# Patient Record
Sex: MALE | Race: ASIAN | NOT HISPANIC OR LATINO | URBAN - METROPOLITAN AREA
[De-identification: names, ages, dates, MRNs, and addresses within clinical notes are randomized per-mention and may not be internally consistent; named-entity substitution may affect disease eponyms.]

---

## 2024-08-01 ENCOUNTER — INPATIENT (INPATIENT)
Facility: HOSPITAL | Age: 74
LOS: 4 days | Discharge: HOME | DRG: 189 | End: 2024-08-06
Attending: STUDENT IN AN ORGANIZED HEALTH CARE EDUCATION/TRAINING PROGRAM | Admitting: STUDENT IN AN ORGANIZED HEALTH CARE EDUCATION/TRAINING PROGRAM
Payer: COMMERCIAL

## 2024-08-01 VITALS
DIASTOLIC BLOOD PRESSURE: 68 MMHG | HEART RATE: 75 BPM | OXYGEN SATURATION: 92 % | WEIGHT: 123.02 LBS | SYSTOLIC BLOOD PRESSURE: 123 MMHG | HEIGHT: 65 IN | TEMPERATURE: 98 F | RESPIRATION RATE: 24 BRPM

## 2024-08-01 DIAGNOSIS — Z95.1 PRESENCE OF AORTOCORONARY BYPASS GRAFT: Chronic | ICD-10-CM

## 2024-08-01 DIAGNOSIS — J96.01 ACUTE RESPIRATORY FAILURE WITH HYPOXIA: ICD-10-CM

## 2024-08-01 LAB
ALBUMIN SERPL ELPH-MCNC: 2.5 G/DL — LOW (ref 3.5–5)
ALP SERPL-CCNC: 100 U/L — SIGNIFICANT CHANGE UP (ref 40–120)
ALT FLD-CCNC: 7 U/L DA — LOW (ref 10–60)
ANION GAP SERPL CALC-SCNC: 8 MMOL/L — SIGNIFICANT CHANGE UP (ref 5–17)
AST SERPL-CCNC: 18 U/L — SIGNIFICANT CHANGE UP (ref 10–40)
BASOPHILS # BLD AUTO: 0.02 K/UL — SIGNIFICANT CHANGE UP (ref 0–0.2)
BASOPHILS NFR BLD AUTO: 0.4 % — SIGNIFICANT CHANGE UP (ref 0–2)
BILIRUB SERPL-MCNC: 0.8 MG/DL — SIGNIFICANT CHANGE UP (ref 0.2–1.2)
BUN SERPL-MCNC: 24 MG/DL — HIGH (ref 7–18)
CALCIUM SERPL-MCNC: 8.8 MG/DL — SIGNIFICANT CHANGE UP (ref 8.4–10.5)
CHLORIDE SERPL-SCNC: 101 MMOL/L — SIGNIFICANT CHANGE UP (ref 96–108)
CO2 SERPL-SCNC: 26 MMOL/L — SIGNIFICANT CHANGE UP (ref 22–31)
CREAT SERPL-MCNC: 5.34 MG/DL — HIGH (ref 0.5–1.3)
EGFR: 11 ML/MIN/1.73M2 — LOW
EOSINOPHIL # BLD AUTO: 0.12 K/UL — SIGNIFICANT CHANGE UP (ref 0–0.5)
EOSINOPHIL NFR BLD AUTO: 2.4 % — SIGNIFICANT CHANGE UP (ref 0–6)
GLUCOSE SERPL-MCNC: 117 MG/DL — HIGH (ref 70–99)
HCT VFR BLD CALC: 27 % — LOW (ref 39–50)
HGB BLD-MCNC: 8.4 G/DL — LOW (ref 13–17)
IMM GRANULOCYTES NFR BLD AUTO: 0.2 % — SIGNIFICANT CHANGE UP (ref 0–0.9)
LYMPHOCYTES # BLD AUTO: 0.45 K/UL — LOW (ref 1–3.3)
LYMPHOCYTES # BLD AUTO: 9.1 % — LOW (ref 13–44)
MAGNESIUM SERPL-MCNC: 1.9 MG/DL — SIGNIFICANT CHANGE UP (ref 1.6–2.6)
MCHC RBC-ENTMCNC: 27.9 PG — SIGNIFICANT CHANGE UP (ref 27–34)
MCHC RBC-ENTMCNC: 31.1 GM/DL — LOW (ref 32–36)
MCV RBC AUTO: 89.7 FL — SIGNIFICANT CHANGE UP (ref 80–100)
MONOCYTES # BLD AUTO: 0.15 K/UL — SIGNIFICANT CHANGE UP (ref 0–0.9)
MONOCYTES NFR BLD AUTO: 3 % — SIGNIFICANT CHANGE UP (ref 2–14)
NEUTROPHILS # BLD AUTO: 4.19 K/UL — SIGNIFICANT CHANGE UP (ref 1.8–7.4)
NEUTROPHILS NFR BLD AUTO: 84.9 % — HIGH (ref 43–77)
NRBC # BLD: 0 /100 WBCS — SIGNIFICANT CHANGE UP (ref 0–0)
NT-PROBNP SERPL-SCNC: HIGH PG/ML (ref 0–125)
PHOSPHATE SERPL-MCNC: 3.5 MG/DL — SIGNIFICANT CHANGE UP (ref 2.5–4.5)
PLATELET # BLD AUTO: 108 K/UL — LOW (ref 150–400)
POTASSIUM SERPL-MCNC: 4.4 MMOL/L — SIGNIFICANT CHANGE UP (ref 3.5–5.3)
POTASSIUM SERPL-SCNC: 4.4 MMOL/L — SIGNIFICANT CHANGE UP (ref 3.5–5.3)
PROT SERPL-MCNC: 7.2 G/DL — SIGNIFICANT CHANGE UP (ref 6–8.3)
RAPID RVP RESULT: SIGNIFICANT CHANGE UP
RBC # BLD: 3.01 M/UL — LOW (ref 4.2–5.8)
RBC # FLD: 17.2 % — HIGH (ref 10.3–14.5)
SARS-COV-2 RNA SPEC QL NAA+PROBE: SIGNIFICANT CHANGE UP
SODIUM SERPL-SCNC: 135 MMOL/L — SIGNIFICANT CHANGE UP (ref 135–145)
WBC # BLD: 4.94 K/UL — SIGNIFICANT CHANGE UP (ref 3.8–10.5)
WBC # FLD AUTO: 4.94 K/UL — SIGNIFICANT CHANGE UP (ref 3.8–10.5)

## 2024-08-01 PROCEDURE — 72100 X-RAY EXAM L-S SPINE 2/3 VWS: CPT | Mod: 26

## 2024-08-01 PROCEDURE — 71045 X-RAY EXAM CHEST 1 VIEW: CPT | Mod: 26

## 2024-08-01 PROCEDURE — 99285 EMERGENCY DEPT VISIT HI MDM: CPT

## 2024-08-01 PROCEDURE — 93010 ELECTROCARDIOGRAM REPORT: CPT

## 2024-08-01 PROCEDURE — 99223 1ST HOSP IP/OBS HIGH 75: CPT | Mod: GC

## 2024-08-01 RX ORDER — ACETAMINOPHEN 500 MG
650 TABLET ORAL EVERY 6 HOURS
Refills: 0 | Status: DISCONTINUED | OUTPATIENT
Start: 2024-08-01 | End: 2024-08-06

## 2024-08-01 RX ORDER — MELATONIN 3 MG
5 TABLET ORAL AT BEDTIME
Refills: 0 | Status: DISCONTINUED | OUTPATIENT
Start: 2024-08-01 | End: 2024-08-06

## 2024-08-01 RX ORDER — LIDOCAINE 5% 5 G/100G
1 CREAM TOPICAL ONCE
Refills: 0 | Status: COMPLETED | OUTPATIENT
Start: 2024-08-01 | End: 2024-08-01

## 2024-08-01 RX ORDER — METHOCARBAMOL 500 MG
1000 TABLET ORAL ONCE
Refills: 0 | Status: COMPLETED | OUTPATIENT
Start: 2024-08-01 | End: 2024-08-01

## 2024-08-01 RX ORDER — ONDANSETRON HCL/PF 4 MG/2 ML
4 VIAL (ML) INJECTION EVERY 8 HOURS
Refills: 0 | Status: DISCONTINUED | OUTPATIENT
Start: 2024-08-01 | End: 2024-08-06

## 2024-08-01 RX ORDER — HEPARIN SODIUM 1000 [USP'U]/ML
5000 INJECTION, SOLUTION INTRAVENOUS; SUBCUTANEOUS EVERY 8 HOURS
Refills: 0 | Status: DISCONTINUED | OUTPATIENT
Start: 2024-08-01 | End: 2024-08-06

## 2024-08-01 RX ORDER — HYDROMORPHONE HCL IN 0.9% NACL 0.2 MG/ML
0.25 PLASTIC BAG, INJECTION (ML) INTRAVENOUS ONCE
Refills: 0 | Status: DISCONTINUED | OUTPATIENT
Start: 2024-08-01 | End: 2024-08-01

## 2024-08-01 RX ORDER — METHYLPREDNISOLONE ACETATE 40 MG/ML
24 INJECTION, SUSPENSION INTRA-ARTICULAR; INTRALESIONAL; INTRAMUSCULAR; INTRASYNOVIAL; SOFT TISSUE ONCE
Refills: 0 | Status: COMPLETED | OUTPATIENT
Start: 2024-08-01 | End: 2024-08-01

## 2024-08-01 RX ORDER — OXYCODONE HYDROCHLORIDE 30 MG/1
5 TABLET ORAL ONCE
Refills: 0 | Status: DISCONTINUED | OUTPATIENT
Start: 2024-08-01 | End: 2024-08-01

## 2024-08-01 RX ORDER — HYDROMORPHONE HCL IN 0.9% NACL 0.2 MG/ML
1 PLASTIC BAG, INJECTION (ML) INTRAVENOUS EVERY 6 HOURS
Refills: 0 | Status: DISCONTINUED | OUTPATIENT
Start: 2024-08-01 | End: 2024-08-02

## 2024-08-01 RX ORDER — OXYCODONE HYDROCHLORIDE 30 MG/1
15 TABLET ORAL EVERY 6 HOURS
Refills: 0 | Status: DISCONTINUED | OUTPATIENT
Start: 2024-08-01 | End: 2024-08-02

## 2024-08-01 RX ORDER — MAGNESIUM, ALUMINUM HYDROXIDE 200-225/5
30 SUSPENSION, ORAL (FINAL DOSE FORM) ORAL EVERY 4 HOURS
Refills: 0 | Status: DISCONTINUED | OUTPATIENT
Start: 2024-08-01 | End: 2024-08-06

## 2024-08-01 RX ADMIN — OXYCODONE HYDROCHLORIDE 5 MILLIGRAM(S): 30 TABLET ORAL at 18:29

## 2024-08-01 RX ADMIN — LIDOCAINE 5% 1 PATCH: 5 CREAM TOPICAL at 14:40

## 2024-08-01 RX ADMIN — Medication 1000 MILLIGRAM(S): at 18:29

## 2024-08-01 RX ADMIN — Medication 0.25 MILLIGRAM(S): at 23:31

## 2024-08-01 RX ADMIN — OXYCODONE HYDROCHLORIDE 5 MILLIGRAM(S): 30 TABLET ORAL at 19:41

## 2024-08-01 RX ADMIN — Medication 0.25 MILLIGRAM(S): at 21:38

## 2024-08-01 RX ADMIN — METHYLPREDNISOLONE ACETATE 24 MILLIGRAM(S): 40 INJECTION, SUSPENSION INTRA-ARTICULAR; INTRALESIONAL; INTRAMUSCULAR; INTRASYNOVIAL; SOFT TISSUE at 18:29

## 2024-08-01 NOTE — CHART NOTE - NSCHARTNOTEFT_GEN_A_CORE
Called by ER for Hemodialysis for this patient admitted for dyspnea and pain.  Chart notes, CXR and labs reviewed.  Recommendations:  - Lasix 80 mg IVP.  - HD scheduled for early tomorrow.  - Supplement O2 as needed.    Daniel Quintanilla MD  Nephrology

## 2024-08-01 NOTE — H&P ADULT - PROBLEM SELECTOR PLAN 4
heparin Patient complaining of severe radiating back pain  oxy and dilaudid, lidocaine patch  consult pain mgmt

## 2024-08-01 NOTE — ED PROVIDER NOTE - CARE PLAN
1 Principal Discharge DX:	Acute respiratory failure with hypoxemia  Secondary Diagnosis:	Acute pulmonary edema

## 2024-08-01 NOTE — ED PROVIDER NOTE - CLINICAL SUMMARY MEDICAL DECISION MAKING FREE TEXT BOX
74 y/o M PMH of AS s/p TAVR, ESRD on HD (MWF), HTN presenting with radicular low back pain   Likely radiculopathy/spinal stenosis  Obtain plain films  Analgesia, Medrol Dose Pack  Reassess  Outpatient spine follow up. 72 y/o M PMH of AS s/p TAVR, ESRD on HD (MWF), HTN presenting with radicular low back pain   Likely radiculopathy/spinal stenosis  Obtain plain films  Analgesia, Medrol Dose Pack  Reassess    Patient hypoxemic with law pulmonary edema on CXR---admit for UF. d/w nephrology 72 y/o M PMH of AS s/p TAVR, ESRD on HD (MWF), HTN presenting with radicular low back pain   Likely radiculopathy/spinal stenosis  Obtain plain films  Analgesia, Medrol Dose Pack  Reassess    Patient hypoxemic with law pulmonary edema on CXR---admit for UF. d/w nephrology  Does not make any urine.

## 2024-08-01 NOTE — ED PROVIDER NOTE - OBJECTIVE STATEMENT
72 y/o M PMH of AS s/p TAVR, ESRD on HD (MWF), HTN presenting with low back pain     Has noted lower back pain for the last week with radicular quality down both legs. No f/c. Last HD yesterday. No chest pain, bowel/bladder incontinence or leg weakness. Has been taking Tylenol at home with little relief. No recent falls. 74 y/o M PMH of AS s/p TAVR, ESRD on HD (MWF), HTN presenting with low back pain     Has noted lower back pain for the last week with radicular quality down both legs. No f/c. Last HD yesterday. No chest pain, bowel/bladder incontinence or leg weakness. Has been taking Tylenol at home with little relief. No recent falls. Patient also reporting SOB. 72 y/o M PMH of AS s/p TAVR, ESRD on HD (MWF), HTN presenting with low back pain     Has noted lower back pain for the last week with radicular quality down both legs. No f/c. Last HD yesterday. No chest pain, bowel/bladder incontinence or leg weakness. Has been taking Tylenol at home with little relief. No recent falls. Patient also reporting SOB, no chest pain. ([facility]). PCP: Dr. Lynch

## 2024-08-01 NOTE — H&P ADULT - NSHPPHYSICALEXAM_GEN_ALL_CORE
PHYSICAL EXAMINATION:  GENERAL: NAD  HEAD:  Atraumatic, Normocephalic  EYES:  conjunctiva and sclera clear  NECK: Supple, mild JVD, Normal thyroid  CHEST/LUNG: bilateral diffuse coarse crackles  HEART: Regular rate and rhythm; No murmurs, rubs, or gallops  ABDOMEN: Soft, Nontender, Nondistended; Bowel sounds present  NERVOUS SYSTEM:  Alert & Oriented X3,    EXTREMITIES:  2+ Peripheral Pulses, No clubbing, cyanosis, or edema. Left forearm fistula  SKIN: warm dry    T(C): 36.5 (08-02-24 @ 00:07), Max: 36.5 (08-02-24 @ 00:07)  HR: 72 (08-02-24 @ 00:07) (72 - 88)  BP: 126/64 (08-02-24 @ 00:07) (123/68 - 127/69)  RR: 20 (08-02-24 @ 00:07) (20 - 24)  SpO2: 98% (08-02-24 @ 00:07) (92% - 100%)

## 2024-08-01 NOTE — H&P ADULT - PROBLEM SELECTOR PLAN 1
2/2 pulm edema due to fluid overload  CXR showing large bilateral consolidations  patient on 3L NC  Patient anuric so no benefit of diuresis  patient to be dialysed  nephro Dr. Quintanilla consulted

## 2024-08-01 NOTE — H&P ADULT - ATTENDING COMMENTS
IMAGING  Chest X-Ray  Pending official read; on my read there is bilateral lower lobe consolidations with moderate pleural effusions.    Lumbar spine  Pending official read; on my read no fracture    EKG  Normal Sinus Rhythm, LVH, 74 bpm, QTc 486ms, MA 198ms, QRS 120ms, on my read no ST segment elevation or depression, TWI in V1, I, aVL    HPI  73 year old male patient with pmhx AS s/p TAVR, CAD s/p CABG 2018, PCI RCA in 6/2019, atrial fibrillation, CHF, ESRD (anuric) on HD (MWF), HTN, HLD, mild PAD who presented to the ER due to low back pain for the past week with radiculopathy down both legs.  The pain is worse both with straightening his back and worse with bending forward. The patient has also been increasingly short of breath for the past 2 days. In the ER patient's SpO2 is 88% on room air. BNP of 21,805 in the ER. Patient denies eating increasingly salty food, but he states he has been drinking more fluids.    Review Of Systems included: + low back pain with radiculopathy, + shortness of breath, + dyspnea on exertion, + chronic constipation,   No orthopnea, no cough, no green or yellow  phlegm production, no fever, no chills, no chest pain, no diarrhea    Physical Exam  General: Awake, Alert, Oriented  Cardiac: RRR, No murmur  Pulmonary: Crackles b/l  Abdominal: Soft, ND, NT  Back: Patient points to middle of low back as source of pain, no muscle tightness, no tenderness to palpation of gluteal muscles, straight leg test with low back pain with mild radiculopathy bilaterally  Extremities: No edema b/l    A/P  # Acute Hypoxic Respiratory Failure  # CHF Exacerbation  # Fluid Overloaded  # Hx of ESRD  > BNP of 21,805 with pleural effusions on CXR, but no edema on lower extremities bilaterally  > 88% O2 saturation on room air  > Anuric; lasix not indicated  > Afebrile, no leukocytosis; pneumonia unlikely  > Patient without chest pain, no heart murmur on exam, emergent transfer not indicated at this time; but, will order a morning ECHO given his recent TAVR about a month ago.  - Consult Cardiology  - Consult Nephrology for HD  - ECHO  - Supplemental O2, wean down as tolerated    # Lower Back Pain with Bilaterally Radiculopathy  Likely Arthritis vs Spinal Stenosis vs Lumbar Disc Herniation  - Consult Pain management  - Tylenol prn for mild pain, Oxycodone prn for moderate pain, Dilaudid prn for severe pain  - Lidocaine Patch    # Hx of AS s/p TAVR, CAD, atrial fibrillation, CHF, ESRD, HTN, HLD, PAD  - Continue home medications metoprolol, aspirin    # DVT PPx  - Heparin    # FEN  - Vegetarian DASH Renal Diet  - Monitor and replete electrolytes as needed  - Avoid standing fluids given ESRD    Previous Admissions Included  6/13/2024 - 6/29/2024: acute hypoxic respiratory failure 2/2 volume overload from ADHF/severe aortic stenosis now s/p Transcatheter aortic valve replacement (TAVR) with Dr. Lindo on 6/24/2024 at [facility].   8/24/2020 - 8/26/2020: Dyspnea 2/2 pleural effusions which may be attributed to hypervolemia in setting of CHF and/or ESRD  5/7/2018 - 5/17/2018: s/p CABG. Off-pump coronary artery bypass grafting x4 (in situ left internal mammary artery to left anterior descending artery, greater saphenous vein graft from the ascending aorta sequentially side-to-side to the first obtuse marginal artery, sequentially side-to-side to the posterior lateral artery, and end-to-side sequentially to posterior descending artery). Post op he had PAF treated with amiodarone IV and beta-blocker and converted to SR. An echo revealed a moderate pericardial effusion and he underwent Pericardial drain placement by IR. POD#5 pericardial drain removed. POD#6 left pleural effusion, s/p pigtail inserted.    Patient case and management was discussed with ER Attending. Patient without chest pain, no heart murmur on exam, emergent transfer not indicated at this time; but, will order a morning ECHO given his recent TAVR about a month ago.  I did examine all labs (including CBC, BMP, Mg, RVP), imaging, prior notes

## 2024-08-01 NOTE — ED ADULT NURSE NOTE - SUICIDE SCREENING QUESTION 2
Patient unable to complete 5-Fu Counseling: 5-Fluorouracil Counseling:  I discussed with the patient the risks of 5-fluorouracil including but not limited to erythema, scaling, itching, weeping, crusting, and pain.

## 2024-08-01 NOTE — ED PROVIDER NOTE - PROGRESS NOTE DETAILS
desaturates to 88% on RA and CXR with law pulmonary edema. d/w Dr. Quintanilla plan for  d/w Dr. Esipnoza admitted to medical service.

## 2024-08-01 NOTE — ED ADULT NURSE NOTE - NSFALLRISKINTERV_ED_ALL_ED
Assistance OOB with selected safe patient handling equipment if applicable/Assistance with ambulation/Communicate fall risk and risk factors to all staff, patient, and family/Provide patient with walking aids/Provide visual cue: yellow wristband, yellow gown, etc/Reinforce activity limits and safety measures with patient and family/Call bell, personal items and telephone in reach/Instruct patient to call for assistance before getting out of bed/chair/stretcher/Non-slip footwear applied when patient is off stretcher/[place] to call system/Physically safe environment - no spills, clutter or unnecessary equipment/Purposeful Proactive Rounding/Room/bathroom lighting operational, light cord in reach

## 2024-08-01 NOTE — ED PROVIDER NOTE - PHYSICAL EXAMINATION
GENERAL: no acute distress; well-developed  HEAD:  Atraumatic, Normocephalic  ENT: MMM; oropharynx clear  NECK: Supple, No JVD  CHEST/LUNG: Clear to auscultation bilaterally; No wheeze  HEART: Regular rate and rhythm; No murmurs, rubs, or gallops  ABDOMEN: Soft, Nontender, Nondistended; Bowel sounds present  EXTREMITIES:  2+ Peripheral Pulses, +AVF  PSYCH: AAOx3  NEUROLOGY: no focal motor or sensory deficits. 5/5 muscle strength in all extremities.   SKIN: No rashes or lesions    BACK: paraspinal lumbar hypertonicity and ttp R >L. No point spinal ttp. GENERAL: no acute distress; well-developed  HEAD:  Atraumatic, Normocephalic  ENT: MMM; oropharynx clear  NECK: Supple, No JVD  CHEST/LUNG: Bibasilar rales.   HEART: Regular rate and rhythm; No murmurs, rubs, or gallops  ABDOMEN: Soft, Nontender, Nondistended; Bowel sounds present  EXTREMITIES:  2+ Peripheral Pulses, +AVF  PSYCH: AAOx3  NEUROLOGY: no focal motor or sensory deficits. 5/5 muscle strength in all extremities.   SKIN: No rashes or lesions    BACK: paraspinal lumbar hypertonicity and ttp R >L. No point spinal ttp.

## 2024-08-01 NOTE — H&P ADULT - HISTORY OF PRESENT ILLNESS
73M PMHx of aortic stenosis s/p TAVR, and ESRD on dialysis MWF presented to the ED with dyspnea and back pain. Pateint stated the dyspnea started 2 days ago and he has not missed any dialysis sessions. Last session was yesterday and was complete. Patient denies any fever, chills, dizziness ,headache,cough, chest pain, palpitations, nausea, vomiting, diarrhea, abdominal pain, urinary symptoms, lower extremity pain, or edema. Patient denies recent travel or sick contacts. Patient also complaining of LBP that started one month previously. Pattient states pain is radiating bilaterally in both legs. Patient denies any  loss of muscle strength, loss of sensation/numbness, and denies bowel or bladder incontinence.

## 2024-08-01 NOTE — H&P ADULT - ASSESSMENT
73M PMHx of aortic stenosis s/p TAVR, and ESRD on dialysis MWF presented to the ED with dyspnea and back pain. Patient admitted for AHRF 2/2 pulmonary edema due to fluid overload.

## 2024-08-02 DIAGNOSIS — J96.01 ACUTE RESPIRATORY FAILURE WITH HYPOXIA: ICD-10-CM

## 2024-08-02 DIAGNOSIS — Z29.9 ENCOUNTER FOR PROPHYLACTIC MEASURES, UNSPECIFIED: ICD-10-CM

## 2024-08-02 DIAGNOSIS — M54.9 DORSALGIA, UNSPECIFIED: ICD-10-CM

## 2024-08-02 DIAGNOSIS — N18.6 END STAGE RENAL DISEASE: ICD-10-CM

## 2024-08-02 DIAGNOSIS — Z95.2 PRESENCE OF PROSTHETIC HEART VALVE: ICD-10-CM

## 2024-08-02 DIAGNOSIS — Z75.8 OTHER PROBLEMS RELATED TO MEDICAL FACILITIES AND OTHER HEALTH CARE: ICD-10-CM

## 2024-08-02 LAB
ALBUMIN SERPL ELPH-MCNC: 2.5 G/DL — LOW (ref 3.5–5)
ALP SERPL-CCNC: 99 U/L — SIGNIFICANT CHANGE UP (ref 40–120)
ALT FLD-CCNC: 7 U/L DA — LOW (ref 10–60)
ANION GAP SERPL CALC-SCNC: 6 MMOL/L — SIGNIFICANT CHANGE UP (ref 5–17)
ANISOCYTOSIS BLD QL: SLIGHT — SIGNIFICANT CHANGE UP
AST SERPL-CCNC: 15 U/L — SIGNIFICANT CHANGE UP (ref 10–40)
BILIRUB SERPL-MCNC: 0.7 MG/DL — SIGNIFICANT CHANGE UP (ref 0.2–1.2)
BUN SERPL-MCNC: 28 MG/DL — HIGH (ref 7–18)
BURR CELLS BLD QL SMEAR: PRESENT — SIGNIFICANT CHANGE UP
CALCIUM SERPL-MCNC: 9 MG/DL — SIGNIFICANT CHANGE UP (ref 8.4–10.5)
CHLORIDE SERPL-SCNC: 100 MMOL/L — SIGNIFICANT CHANGE UP (ref 96–108)
CO2 SERPL-SCNC: 28 MMOL/L — SIGNIFICANT CHANGE UP (ref 22–31)
CREAT SERPL-MCNC: 6.05 MG/DL — HIGH (ref 0.5–1.3)
EGFR: 9 ML/MIN/1.73M2 — LOW
GLUCOSE SERPL-MCNC: 150 MG/DL — HIGH (ref 70–99)
HBV SURFACE AB SER-ACNC: REACTIVE
HBV SURFACE AG SER-ACNC: SIGNIFICANT CHANGE UP
HCT VFR BLD CALC: 26.1 % — LOW (ref 39–50)
HGB BLD-MCNC: 8 G/DL — LOW (ref 13–17)
MAGNESIUM SERPL-MCNC: 2.2 MG/DL — SIGNIFICANT CHANGE UP (ref 1.6–2.6)
MANUAL SMEAR VERIFICATION: SIGNIFICANT CHANGE UP
MCHC RBC-ENTMCNC: 28 PG — SIGNIFICANT CHANGE UP (ref 27–34)
MCHC RBC-ENTMCNC: 30.7 GM/DL — LOW (ref 32–36)
MCV RBC AUTO: 91.3 FL — SIGNIFICANT CHANGE UP (ref 80–100)
NRBC # BLD: 0 /100 WBCS — SIGNIFICANT CHANGE UP (ref 0–0)
OVALOCYTES BLD QL SMEAR: SLIGHT — SIGNIFICANT CHANGE UP
PHOSPHATE SERPL-MCNC: 3.3 MG/DL — SIGNIFICANT CHANGE UP (ref 2.5–4.5)
PLAT MORPH BLD: NORMAL — SIGNIFICANT CHANGE UP
PLATELET # BLD AUTO: 123 K/UL — LOW (ref 150–400)
PLATELET COUNT - ESTIMATE: ABNORMAL
POTASSIUM SERPL-MCNC: 4.7 MMOL/L — SIGNIFICANT CHANGE UP (ref 3.5–5.3)
POTASSIUM SERPL-SCNC: 4.7 MMOL/L — SIGNIFICANT CHANGE UP (ref 3.5–5.3)
PROT SERPL-MCNC: 7.2 G/DL — SIGNIFICANT CHANGE UP (ref 6–8.3)
RBC # BLD: 2.86 M/UL — LOW (ref 4.2–5.8)
RBC # FLD: 17 % — HIGH (ref 10.3–14.5)
RBC BLD AUTO: ABNORMAL
SODIUM SERPL-SCNC: 134 MMOL/L — LOW (ref 135–145)
WBC # BLD: 2.3 K/UL — LOW (ref 3.8–10.5)
WBC # FLD AUTO: 2.3 K/UL — LOW (ref 3.8–10.5)

## 2024-08-02 PROCEDURE — 99222 1ST HOSP IP/OBS MODERATE 55: CPT

## 2024-08-02 RX ORDER — EPOETIN ALFA 3000 [IU]/ML
10000 SOLUTION INTRAVENOUS; SUBCUTANEOUS
Refills: 0 | Status: DISCONTINUED | OUTPATIENT
Start: 2024-08-02 | End: 2024-08-06

## 2024-08-02 RX ORDER — TRAMADOL HCL 50 MG
25 TABLET ORAL EVERY 8 HOURS
Refills: 0 | Status: DISCONTINUED | OUTPATIENT
Start: 2024-08-02 | End: 2024-08-06

## 2024-08-02 RX ORDER — ACETAMINOPHEN 500 MG
1000 TABLET ORAL EVERY 8 HOURS
Refills: 0 | Status: DISCONTINUED | OUTPATIENT
Start: 2024-08-02 | End: 2024-08-06

## 2024-08-02 RX ORDER — HYDROMORPHONE HCL IN 0.9% NACL 0.2 MG/ML
0.5 PLASTIC BAG, INJECTION (ML) INTRAVENOUS EVERY 6 HOURS
Refills: 0 | Status: DISCONTINUED | OUTPATIENT
Start: 2024-08-02 | End: 2024-08-02

## 2024-08-02 RX ORDER — GABAPENTIN 400 MG/1
300 CAPSULE ORAL AT BEDTIME
Refills: 0 | Status: DISCONTINUED | OUTPATIENT
Start: 2024-08-02 | End: 2024-08-06

## 2024-08-02 RX ORDER — OXYCODONE HYDROCHLORIDE 30 MG/1
2.5 TABLET ORAL EVERY 6 HOURS
Refills: 0 | Status: DISCONTINUED | OUTPATIENT
Start: 2024-08-02 | End: 2024-08-02

## 2024-08-02 RX ORDER — LIDOCAINE 5% 5 G/100G
1 CREAM TOPICAL EVERY 24 HOURS
Refills: 0 | Status: DISCONTINUED | OUTPATIENT
Start: 2024-08-02 | End: 2024-08-06

## 2024-08-02 RX ORDER — BACTERIOSTATIC SODIUM CHLORIDE 0.9 %
100 VIAL (ML) INJECTION
Refills: 0 | Status: DISCONTINUED | OUTPATIENT
Start: 2024-08-02 | End: 2024-08-06

## 2024-08-02 RX ORDER — HYDROMORPHONE HCL IN 0.9% NACL 0.2 MG/ML
0.25 PLASTIC BAG, INJECTION (ML) INTRAVENOUS EVERY 6 HOURS
Refills: 0 | Status: DISCONTINUED | OUTPATIENT
Start: 2024-08-02 | End: 2024-08-02

## 2024-08-02 RX ORDER — METHOCARBAMOL 500 MG
500 TABLET ORAL EVERY 8 HOURS
Refills: 0 | Status: COMPLETED | OUTPATIENT
Start: 2024-08-02 | End: 2024-08-05

## 2024-08-02 RX ORDER — TRAMADOL HCL 50 MG
50 TABLET ORAL EVERY 8 HOURS
Refills: 0 | Status: DISCONTINUED | OUTPATIENT
Start: 2024-08-02 | End: 2024-08-06

## 2024-08-02 RX ADMIN — Medication 500 MILLIGRAM(S): at 21:40

## 2024-08-02 RX ADMIN — HEPARIN SODIUM 5000 UNIT(S): 1000 INJECTION, SOLUTION INTRAVENOUS; SUBCUTANEOUS at 13:51

## 2024-08-02 RX ADMIN — Medication 0.5 MILLIGRAM(S): at 09:40

## 2024-08-02 RX ADMIN — Medication 1 MILLIGRAM(S): at 01:41

## 2024-08-02 RX ADMIN — Medication 500 MILLIGRAM(S): at 15:24

## 2024-08-02 RX ADMIN — Medication 1000 MILLIGRAM(S): at 15:24

## 2024-08-02 RX ADMIN — HEPARIN SODIUM 5000 UNIT(S): 1000 INJECTION, SOLUTION INTRAVENOUS; SUBCUTANEOUS at 21:40

## 2024-08-02 RX ADMIN — Medication 1000 MILLIGRAM(S): at 16:27

## 2024-08-02 RX ADMIN — Medication 1000 MILLIGRAM(S): at 22:10

## 2024-08-02 RX ADMIN — Medication 1000 MILLIGRAM(S): at 21:40

## 2024-08-02 RX ADMIN — LIDOCAINE 5% 1 PATCH: 5 CREAM TOPICAL at 01:48

## 2024-08-02 RX ADMIN — EPOETIN ALFA 10000 UNIT(S): 3000 SOLUTION INTRAVENOUS; SUBCUTANEOUS at 11:54

## 2024-08-02 RX ADMIN — Medication 0.5 MILLIGRAM(S): at 09:09

## 2024-08-02 RX ADMIN — LIDOCAINE 5% 1 PATCH: 5 CREAM TOPICAL at 13:51

## 2024-08-02 RX ADMIN — Medication 5 MILLIGRAM(S): at 01:41

## 2024-08-02 RX ADMIN — HEPARIN SODIUM 5000 UNIT(S): 1000 INJECTION, SOLUTION INTRAVENOUS; SUBCUTANEOUS at 05:17

## 2024-08-02 RX ADMIN — LIDOCAINE 5% 1 PATCH: 5 CREAM TOPICAL at 19:26

## 2024-08-02 RX ADMIN — Medication 1 MILLIGRAM(S): at 02:11

## 2024-08-02 RX ADMIN — GABAPENTIN 300 MILLIGRAM(S): 400 CAPSULE ORAL at 21:40

## 2024-08-02 NOTE — DISCHARGE NOTE PROVIDER - PROVIDER TOKENS
FREE:[LAST:[oodal],FIRST:[zamzam],PHONE:[phone]],FAX:[(   )    -],ADDRESS:[[address] [place] [address]],FOLLOWUP:[1 week],ESTABLISHEDPATIENT:[T]]

## 2024-08-02 NOTE — PATIENT PROFILE ADULT - FALL HARM RISK - RISK INTERVENTIONS

## 2024-08-02 NOTE — DISCHARGE NOTE PROVIDER - NSDCFUADDAPPT_GEN_ALL_CORE_FT
APPTS ARE READY TO BE MADE: [X] YES    Best Family or Patient Contact (if needed):    Additional Information about above appointments (if needed):    1: PCP: Tyler Resendiz   2:   3:     Other comments or requests:    APPTS ARE READY TO BE MADE: [X] YES    Best Family or Patient Contact (if needed):    Additional Information about above appointments (if needed):    1: PCP: Tyler Resendiz   2:   3:     Other comments or requests:     Patient secured an appointment on their own for PCP Dr. Delatorre for 8/14/2024 at 10am, [address] [place] [address].

## 2024-08-02 NOTE — PROGRESS NOTE ADULT - PROBLEM SELECTOR PLAN 1
2/2 pulm edema due to fluid overload  CXR showing large bilateral consolidations  Patient on 3L NC  Wean oxygen  Patient anuric so no benefit of diuresis  patient to be dialyzed  Nephro Dr. Quintanilla consulted

## 2024-08-02 NOTE — DISCHARGE NOTE PROVIDER - NSDCMRMEDTOKEN_GEN_ALL_CORE_FT
aspirin 81 mg oral capsule: 1 cap(s) orally once a day  atorvastatin 80 mg oral tablet: 1 tab(s) orally once a day (at bedtime)  ferrous sulfate: 325 milligram(s) orally once a day  losartan 50 mg oral tablet: 1 tab(s) orally once a day  Protonix 40 mg oral delayed release tablet: 1 tab(s) orally once a day  Toprol-XL 25 mg oral tablet, extended release: 1 tab(s) orally once a day   acetaminophen 325 mg oral tablet: 2 tab(s) orally every 6 hours As needed Temp greater or equal to 38C (100.4F), Mild Pain (1 - 3)  acetaminophen 500 mg oral tablet: 2 tab(s) orally every 8 hours  aspirin 81 mg oral capsule: 1 cap(s) orally once a day  aspirin 81 mg oral tablet: 1 tab(s) orally once a day  atorvastatin 80 mg oral tablet: 1 tab(s) orally once a day (at bedtime)  cyclobenzaprine 5 mg oral tablet: 1 tab(s) orally once a day as needed for Muscle Spasm  ferrous sulfate: 325 milligram(s) orally once a day  folic acid: 1 milligram(s) orally once a day  hydrALAZINE 25 mg oral tablet: 1 tab(s) orally every 8 hours  losartan 50 mg oral tablet: 1 tab(s) orally once a day  meclizine 12.5 mg oral tablet: 1 tab(s) orally every 8 hours as needed for  dizziness  methocarbamol 500 mg oral tablet: 1 tab(s) orally every 8 hours as needed for  mild pain  Neurontin 100 mg oral capsule: 1 cap(s) orally 3 times a day  NIFEdipine (Eqv-Procardia XL) 90 mg oral tablet, extended release: 1 tab(s) orally once a day  polyethylene glycol 3350 oral powder for reconstitution: 17 gram(s) orally once a day  Protonix 40 mg oral delayed release tablet: 1 tab(s) orally once a day  senna leaf extract oral tablet: 2 tab(s) orally once a day (at bedtime)  ticagrelor 90 mg oral tablet: 1 tab(s) orally 2 times a day   acetaminophen 325 mg oral tablet: 2 tab(s) orally every 6 hours As needed Temp greater or equal to 38C (100.4F), Mild Pain (1 - 3)  acetaminophen 500 mg oral tablet: 2 tab(s) orally every 8 hours  aspirin 81 mg oral capsule: 1 cap(s) orally once a day  aspirin 81 mg oral tablet: 1 tab(s) orally once a day  atorvastatin 80 mg oral tablet: 1 tab(s) orally once a day (at bedtime)  ferrous sulfate: 325 milligram(s) orally once a day  folic acid: 1 milligram(s) orally once a day  hydrALAZINE 25 mg oral tablet: 1 tab(s) orally every 8 hours  losartan 50 mg oral tablet: 1 tab(s) orally once a day  meclizine 12.5 mg oral tablet: 1 tab(s) orally every 8 hours as needed for  dizziness  methocarbamol 500 mg oral tablet: 1 tab(s) orally every 8 hours as needed for  mild pain  Neurontin 100 mg oral capsule: 1 cap(s) orally 3 times a day  NIFEdipine (Eqv-Procardia XL) 90 mg oral tablet, extended release: 1 tab(s) orally once a day  polyethylene glycol 3350 oral powder for reconstitution: 17 gram(s) orally once a day  Protonix 40 mg oral delayed release tablet: 1 tab(s) orally once a day  senna leaf extract oral tablet: 2 tab(s) orally once a day (at bedtime)  ticagrelor 90 mg oral tablet: 1 tab(s) orally 2 times a day

## 2024-08-02 NOTE — CONSULT NOTE ADULT - PROBLEM SELECTOR RECOMMENDATION 9
Pt with acute low back pain with bilateral lower extremity radiculopathy  which is somatic and neuropathic in nature likely due to sciatica. [facility] with [facility] HD (MWF[facility]   Opioid pain recommendations   - Discontinue Dilaudid IVP and Oxycodone PO, due to current respiratory status best to utilize lower potency opioid if necessary  - Start Tramadol 25/50mg PO q 8hrs PRN for moderate/severe pain. Monitor for sedation and respiratory depression  Non-opioid pain recommendations   - No NSAIDs due to ESRD  - Start Acetaminophen 1 gram PO q 8 hours x 4 days. Monitor LFTs  - Start Gabapentin 300 mg po at HS. (ESRD dose)  - Continue Lidoderm 4% patch daily. (12 hrs on/12 hrs off)  - Start Robaxin 500mg PO q 8hrs x 3 days. Monitor for sedation.   Bowel Regimen  - Continue Miralax 17G PO daily  - Continue Senna 2 tablets at bedtime for constipation  Mild pain (score 1-3)  - Non-pharmacological pain treatment recommendations  - Warm/ Cool packs PRN   - Repositioning extremity, elevation, imagery, relaxation, distraction.  - Physical therapy OOB if no contraindications   Recommendations discussed with primary team and RN

## 2024-08-02 NOTE — DISCHARGE NOTE PROVIDER - CARE PROVIDER_API CALL
zamzam bennett  [address] [place] [address]  Phone: [phone]  Fax: (   )    -  Established Patient  Follow Up Time: 1 week

## 2024-08-02 NOTE — CONSULT NOTE ADULT - SUBJECTIVE AND OBJECTIVE BOX
DIOGENES PHILLIPOR  Patient is a 73y old  Male who presents with a chief complaint of AHRF 2/2 Pulm edema (01 Aug 2024 23:09)    HPI:  73M PMHx of aortic stenosis s/p TAVR, and ESRD on dialysis MWF presented to the ED with dyspnea and back pain. Pateint stated the dyspnea started 2 days ago and he has not missed any dialysis sessions. Last session was yesterday and was complete. Patient denies any fever, chills, dizziness ,headache,cough, chest pain, palpitations, nausea, vomiting, diarrhea, abdominal pain, urinary symptoms, lower extremity pain, or edema. Patient denies recent travel or sick contacts. Patient also complaining of LBP that started one month previously.  He gets his dialysis at [facility].     PAST MEDICAL & SURGICAL HISTORY:  ESRD on dialysis      S/P TAVR (transcatheter aortic valve replacement)        MEDICATIONS  (STANDING):  epoetin marissa-epbx (RETACRIT) Injectable 83727 Unit(s) IV Push <User Schedule>  heparin   Injectable 5000 Unit(s) SubCutaneous every 8 hours  lidocaine   4% Patch 1 Patch Transdermal every 24 hours    Allergies    No Known Allergies    Intolerances      FAMILY HISTORY:      REVIEW OF SYSTEMS    General:  No fever, chills or night sweats.    Ophthalmologic: No changes in vision.  	  ENMT: No difficulty swallowing. 	    Respiratory and Thorax: No cough, wheezes but has dyspnea.  	  Cardiovascular: No chest pains, tiredness or palpitations.	    Gastrointestinal: No dyspepsia, constipation or diarrhea.	    Genitourinary:	 No dysuria, hematuria or frequency.    Musculoskeletal: No joint pains or swelling. No muscle pains.    Neurological:	No weakness or numbness. No seizures.    Hematology/Lymphatics: No heat or cold intolerance.    Endocrine: No polyuria or polydipsia.	      Vital Signs Last 24 Hrs  T(C): 36.6 (02 Aug 2024 05:32), Max: 36.8 (02 Aug 2024 01:54)  T(F): 97.8 (02 Aug 2024 05:32), Max: 98.2 (02 Aug 2024 01:54)  HR: 76 (02 Aug 2024 05:32) (72 - 88)  BP: 127/64 (02 Aug 2024 05:32) (123/68 - 150/83)  BP(mean): --  RR: 18 (02 Aug 2024 05:32) (18 - 24)  SpO2: 95% (02 Aug 2024 05:32) (92% - 100%)    Parameters below as of 02 Aug 2024 05:32  Patient On (Oxygen Delivery Method): nasal cannula  O2 Flow (L/min): 2      PHYSICAL EXAMINATION:  Constitutional:  He appears comfortable and not distressed. Not diaphoretic.    Neck:  The thyroid is normal. Trachea is midline.     Respiratory: The lungs are clear to auscultation. No dullness and expansion is normal.    Cardiovascular: S1 and S2 are normal. No mummurs, rubs or gallops are present.    Gastrointestinal: The abdomen is soft. No tenderness is present. No masses are present. Bowel sounds are normal.    Genitourinary: The bladder is not distended. No CVA tenderness is present.    Extremities: No edema is noted. No deformities are present.    Neurological: Cognition is normal. Tone, power and sensation are normal. Gait is steady.    Skin: No leasions are seen  or palpated.    Lymph Nodes: No lymphadenopathy is present.    Psychiatric: Mood is appropriate. No hallucinations or flight of ideas are noted.                            8.0    2.30  )-----------( 123      ( 02 Aug 2024 05:14 )             26.1     08-02    134<L>  |  100  |  28<H>  ----------------------------<  150<H>  4.7   |  28  |  6.05<H>    Ca    9.0      02 Aug 2024 05:14  Phos  3.3     08-02  Mg     2.2     08-02    TPro  7.2  /  Alb  2.5<L>  /  TBili  0.7  /  DBili  x   /  AST  15  /  ALT  7<L>  /  AlkPhos  99  08-02    LIVER FUNCTIONS - ( 02 Aug 2024 05:14 )  Alb: 2.5 g/dL / Pro: 7.2 g/dL / ALK PHOS: 99 U/L / ALT: 7 U/L DA / AST: 15 U/L / GGT: x           Phosphorus: 3.3 mg/dL (08.02.24 @ 05:14) 
Source of information: MARCELLO SHETTY, Chart review  Patient language: English  : n/a    HPI:  73M PMHx of aortic stenosis s/p TAVR, and ESRD on dialysis MWF presented to the ED with dyspnea and back pain. Pateint stated the dyspnea started 2 days ago and he has not missed any dialysis sessions. Last session was yesterday and was complete. Patient denies any fever, chills, dizziness ,headache,cough, chest pain, palpitations, nausea, vomiting, diarrhea, abdominal pain, urinary symptoms, lower extremity pain, or edema. Patient denies recent travel or sick contacts. Patient also complaining of LBP that started one month previously. Pattient states pain is radiating bilaterally in both legs. Patient denies any  loss of muscle strength, loss of sensation/numbness, and denies bowel or bladder incontinence. (01 Aug 2024 23:09)    Patient is a 73y old  Male who presents with a chief complaint of AHRF 2/2 Pulm edema (02 Aug 2024 15:37)    Pt is admitted for AHRF secondary to pulmonary edema. Chest xray demonstrates bilateral perihilar reticular and patchy lung opacities, possibly pulmonary edema. Bilateral small loculated pleural effusions with likely associated passive atelectasis. Pain service consulted on 8/2 for management of low back pain. Xray LS demonstrates noradiographic lumbar spine osseous pathology. Pt seen and examined at bedside, this afternoon. At time of assessment, patient laying in bed in NAD, reports low back pain score 8/10 SCALE USED: (1-10 VNRS). Per patient, low back pain started a few months ago and has gotten progressively worse over the past few weeks. Pt describes pain as localized to the lumbar spine constant, throbbing, and burning in quality with intermittent radiation down bilateral lower extremities, c The pain is currently not alleviated by pain medication and is exacerbated by movement. Pt tolerating PO diet. Denies lethargy, chest pain, SOB, nausea, vomiting, constipation. Reports last BM 8/1. Patient stated goal for pain control: to be able to take deep breaths, get out of bed to chair and ambulate with tolerable pain control. Pt reports taking Tylenol at home without relief.    PAST MEDICAL & SURGICAL HISTORY:  ESRD on dialysis    S/P TAVR (transcatheter aortic valve replacement)    FAMILY HISTORY:    Social History:  [x] Denies ETOH use, illicit drug use and smoking    Allergies    No Known Allergies    Intolerances    MEDICATIONS  (STANDING):  acetaminophen     Tablet .. 1000 milliGRAM(s) Oral every 8 hours  epoetin marissa-epbx (RETACRIT) Injectable 27640 Unit(s) IV Push <User Schedule>  gabapentin 300 milliGRAM(s) Oral at bedtime  heparin   Injectable 5000 Unit(s) SubCutaneous every 8 hours  lidocaine   4% Patch 1 Patch Transdermal every 24 hours  methocarbamol 500 milliGRAM(s) Oral every 8 hours    MEDICATIONS  (PRN):  acetaminophen     Tablet .. 650 milliGRAM(s) Oral every 6 hours PRN Temp greater or equal to 38C (100.4F), Mild Pain (1 - 3)  aluminum hydroxide/magnesium hydroxide/simethicone Suspension 30 milliLiter(s) Oral every 4 hours PRN Dyspepsia  melatonin 5 milliGRAM(s) Oral at bedtime PRN Insomnia  ondansetron Injectable 4 milliGRAM(s) IV Push every 8 hours PRN Nausea and/or Vomiting  sodium chloride 0.9% Bolus. 100 milliLiter(s) IV Bolus every 5 minutes PRN SBP LESS THAN or EQUAL to 90 mmHg  traMADol 25 milliGRAM(s) Oral every 8 hours PRN Moderate Pain (4 - 6)  traMADol 50 milliGRAM(s) Oral every 8 hours PRN Severe Pain (7 - 10)    Vital Signs Last 24 Hrs  T(C): 37 (02 Aug 2024 14:30), Max: 37 (02 Aug 2024 14:30)  T(F): 98.6 (02 Aug 2024 14:30), Max: 98.6 (02 Aug 2024 14:30)  HR: 91 (02 Aug 2024 14:30) (72 - 91)  BP: 133/75 (02 Aug 2024 14:30) (123/68 - 150/83)  BP(mean): --  RR: 18 (02 Aug 2024 14:30) (17 - 24)  SpO2: 97% (02 Aug 2024 14:30) (92% - 100%)    Parameters below as of 02 Aug 2024 14:30  Patient On (Oxygen Delivery Method): nasal cannula  O2 Flow (L/min): 2    LABS: Reviewed.               8.0    2.30  )-----------( 123      ( 02 Aug 2024 05:14 )             26.1     08-02    134<L>  |  100  |  28<H>  ----------------------------<  150<H>  4.7   |  28  |  6.05<H>    Ca    9.0      02 Aug 2024 05:14  Phos  3.3     08-02  Mg     2.2     08-02    TPro  7.2  /  Alb  2.5<L>  /  TBili  0.7  /  DBili  x   /  AST  15  /  ALT  7<L>  /  AlkPhos  99  08-02    LIVER FUNCTIONS - ( 02 Aug 2024 05:14 )  Alb: 2.5 g/dL / Pro: 7.2 g/dL / ALK PHOS: 99 U/L / ALT: 7 U/L DA / AST: 15 U/L / GGT: x           Urinalysis Basic - ( 02 Aug 2024 05:14 )    Color: x / Appearance: x / SG: x / pH: x  Gluc: 150 mg/dL / Ketone: x  / Bili: x / Urobili: x   Blood: x / Protein: x / Nitrite: x   Leuk Esterase: x / RBC: x / WBC x   Sq Epi: x / Non Sq Epi: x / Bacteria: x    CAPILLARY BLOOD GLUCOSE    SARS-CoV-2: NotDetec (01 Aug 2024 19:33)    Radiology: Reviewed.   < from: Xray Lumbar Spine AP + Lateral (08.01.24 @ 18:15) >    ACC: [id number] EXAM:  XR LS SPINE AP LAT 2-3 VIEWS   ORDERED BY:    KETURAH CHAUDHARI     PROCEDURE DATE:  08/01/2024      INTERPRETATION:  Radiographs of the lumbar spine    CLINICAL INFORMATION:  Lower back  Pain for one week..    TECHNIQUE: Lumbar spine frontal, lateral views and lateral coned-down   view of the lumbosacral junction.    FINDINGS:  No previous examinations are available for review.    Lumbar vertebral alignment is maintained.   Lumbar vertebral body heights are preserved.  Pedicles are intact.  No fracture or vertebral subluxation..    Lumbar intervertebral disc space heights are maintained.    No significant degenerative productive changes.    The sacroiliac joints are  intact.  Atherosclerotic calcified intimal wall plaques within nondilated   abdominal aorta.    IMPRESSION:   No  radiographic lumbar spine osseous pathology.  If pain persist despite conservative therapy and occult fracture or   spinal canal/foraminal nerve root compression clinically suspected,   further assessment with CT or MRI recommended.    --- End of Report ---    SUMANTH TRUJILLO MD; Attending Radiologist  This document has been electronically signed. Aug  2 2024  9:19AM    < end of copied text >    < from: Xray Chest 1 View- PORTABLE-Urgent (Xray Chest 1 View- PORTABLE-Urgent .) (08.01.24 @ 20:51) >    ACC: [id number] EXAM:  XR CHEST PORTABLE URGENT 1V   ORDERED BY:    KETURAH CHAUDHARI     PROCEDURE DATE:  08/01/2024      INTERPRETATION:  TIME OF EXAM: August 1, 2024 at 8:44 PM.    CLINICAL INFORMATION: End-stage renal disease. Shortness ofbreath.    COMPARISON:  None available    TECHNIQUE:   AP Portable chest x-ray.    INTERPRETATION:    Heart size and the mediastinum cannot be accurately evaluated on this   projection. There are median sternotomy sutures, surgical clips, and   status post TAVR.  There are bilateral perihilar reticular and patchy lung opacities,   possibly pulmonary edema.  There are bilateral small, loculated appearing pleural effusions with   likely associated passive atelectasis.  Right apical opacity may be pleural thickening or an apical cap of   pleural fluid.  No pneumothorax is seen.  There is no acute bony abnormality.      IMPRESSION:  Bilateral perihilar reticular and patchy lung opacities,   possibly pulmonary edema. Other underlying pathology including, but not   limited to, pneumonia is not excluded.    Bilateral small loculated pleural effusions with likely associated   passive atelectasis.    --- End of Report ---    TASHIA BEST MD; Attending Radiologist  This document has been electronically signed. Aug  2 2024 12:20PM    < end of copied text >    ORT Score -   Family Hx of substance abuse	Female	      Male  Alcohol 	                                           1                     3  Illegal drugs	                                   2                     3  Rx drugs                                           4 	                  4  Personal Hx of substance abuse		  Alcohol 	                                          3	                  3  Illegal drugs                                     4	                  4  Rx drugs                                            5 	                  5  Age between 16- 45 years	           1                     1  hx preadolescent sexual abuse	   3 	                  0  Psychological disease		  ADD, OCD, bipolar, schizophrenia   2	          2  Depression                                           1 	          1  Total: 0    a score of 3 or lower indicates low risk for opioid abuse		  a score of 4-7 indicates moderate risk for opioid abuse		  a score of 8 or higher indicates high risk for opioid abuse  	  REVIEW OF SYSTEMS:  CONSTITUTIONAL: No fever or fatigue  HEENT:  No difficulty hearing, no change in vision  NECK: No pain or stiffness  RESPIRATORY: No cough, wheezing, chills or hemoptysis; + SOB  CARDIOVASCULAR: No chest pain, palpitations, dizziness, or leg swelling  GASTROINTESTINAL: No loss of appetite, decreased PO intake. No abdominal or epigastric pain. No nausea, vomiting; No diarrhea or constipation.   GENITOURINARY: No dysuria, frequency, hematuria, retention or incontinence  MUSCULOSKELETAL: + low back pain with radiation to bilateral lower extremities, no upper motor strength weakness, + lower motor strength weakness due to pain, no saddle anesthesia, bowel/bladder incontinence, no falls   NEURO: No headaches, + BLE numbness/tingling  ENDOCRINE: No polyuria, polydipsia, heat or cold intolerance; No hair loss  PSYCHIATRIC: No depression, anxiety or difficulty sleeping    PHYSICAL EXAM:  GENERAL:  Alert & Oriented X3, cooperative, NAD, Good concentration. Speech is clear.   RESPIRATORY: Respirations even and unlabored. Clear to auscultation bilaterally, 2L O2 via NC in place  CARDIOVASCULAR: Normal S1/S2, regular rate and rhythm;  GASTROINTESTINAL:  Soft, Nontender, Nondistended; Bowel sounds present  PERIPHERAL VASCULAR:  Extremities warm without edema. 2+ Peripheral Pulses, No cyanosis, No calf tenderness, + L AVF +bruit/thrill  MUSCULOSKELETAL: Motor Strength 5/5 B/L upper and 3/5 lower extremities; decreased bilateral lower extremity ROM due to pain; + SLR bilaterally at 30 degrees; + lumbar paraspinal tenderness on palpation  SKIN: Warm, dry, intact.     Risk factors associated with adverse outcomes related to opioid treatment  [ ] Concurrent benzodiazepine use  [ ] History/ Active substance use or alcohol use disorder  [ ] Psychiatric co-morbidity  [ ] Sleep apnea  [ ] COPD  [ ] BMI> 35  [ ] Liver dysfunction  [x] Renal dysfunction  [ ] CHF  [ ] Smoker  [x] Age > 60 years    [x]  [place]  Reviewed and Copied to Chart. See below.    Plan of care and goal oriented pain management treatment options were discussed with patient and /or primary care giver; all questions and concerns were addressed and care was aligned with patient's wishes.    Educated patient on goal oriented pain management treatment options

## 2024-08-02 NOTE — CONSULT NOTE ADULT - ASSESSMENT
ESRD:  On HD MWF via an A-V fistula.  He was dyspneic.  - HD today and MWF.  - Renal diet.    Acute Pulmonary Edema:  Improved. Etiology is unclear.  - UF today at HD. Give his BMI, will attempt 2.5 L UF.  - ECHO.  - CXR repeat post HD.      Anemia:  - Resume EPO at HD.    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  - Resume binders once PO4 is > 5.    Daniel Quintanilla MD  Nephrology  
Search Terms: Randy [place]la, 1950Search Date: 08/02/2024 12:48:37 PM  The Drug Utilization Report below displays all of the controlled substance prescriptions, if any, that your patient has filled in the last twelve months. The information displayed on this report is compiled from pharmacy submissions to the Department, and accurately reflects the information as submitted by the pharmacies.    This report was requested by: Lamar Moreno | Reference #: [id number]    Practitioner Count: 1  Pharmacy Count: 1  Current Opioid Prescriptions: 0  Current Benzodiazepine Prescriptions: 0  Current Stimulant Prescriptions: 0      Patient Demographic Information (PDI)       PDI	First Name	Last Name	Birth Date	Gender	Street Address	[facility] Code  A	Randy Benites	1950	Male	[id number] [address]    Prescription Information      PDI Filter:    PDI	My Rx	Current Rx	Drug Type	Rx Written	Rx Dispensed	Drug	Quantity	Days Supply	Prescriber Name	Prescriber KAREN #	Payment Method	Dispenser  A	N	N	O	05/10/2024	05/13/2024	tramadol hcl 50 mg tablet	21	7	Hari Kolb	[id number]	Insurance	[facility]    * - Details of Drug Type : O = Opioid, B = Benzodiazepine, S = Stimulant

## 2024-08-02 NOTE — PHYSICAL THERAPY INITIAL EVALUATION ADULT - RANGE OF MOTION EXAMINATION, REHAB EVAL
Right UE ROM was WNL (within normal limits)/bilateral lower extremity was ROM was WNL (within normal limits)/Left UE ROM was WFL (within functional limits)

## 2024-08-02 NOTE — PHYSICAL THERAPY INITIAL EVALUATION ADULT - GENERAL OBSERVATIONS, REHAB EVAL
male patient, aox3, outpt hd center, breakfast seated in bed, pt safe independent level-incline gait and transfers

## 2024-08-02 NOTE — PROGRESS NOTE ADULT - PROBLEM SELECTOR PLAN 4
Patient complaining of severe radiating lower back pain  Xray lumbar spine:  No radiographic lumbar spine osseous pathology.  C/W Tylenol 100-mg q8 x 4days, lidocaine patch, Tramadol 25mg q8h mod pain, Tramadol 50mg q8 severe pain,  Gabapentin 300mg qhs, Robaxin 500mg q8  consulted pain mgmt  PT eval: Home PT

## 2024-08-02 NOTE — DISCHARGE NOTE PROVIDER - HOSPITAL COURSE
73M PMHx of aortic stenosis s/p TAVR, and ESRD on dialysis MWF presented to the ED with dyspnea and back pain. Patient admitted  for AHRF 2/2 pulmonary edema iso fluid overload.  CXR showing large bilateral consolidation, with moderate pleural effusion. Patient started on 3L NC. Wean oxygen as tolerated.   Was unable to initiate diuresis due to anuric state  Patient was dialyzed with clinical improvement. Nephro Dr. Quintanilla consulted.  Echo ordered for history of aortic stenosis s/p TAVR.     Echo shows ???????????????  Patient also complained of lower back pain, xray of lumbar spine was unremarkable for acute findings. Pt was evaluated by pain management.  PT recommends Home PT    Please note that this a brief summary of hospital course please refer to daily progress notes and consult notes for full course and events. Patient seen and examined at bedside, discussed with medical attending. Patient medically cleared for discharge to.   73M PMHx of aortic stenosis s/p TAVR, and ESRD on dialysis MWF presented to the ED with dyspnea and back pain. Patient admitted  for AHRF 2/2 pulmonary edema iso fluid overload.  CXR showing large bilateral consolidation, with moderate pleural effusion. Patient started on 3L NC. Wean oxygen as tolerated off room air   Was unable to initiate diuresis due to anuric state  Patient was dialyzed with clinical improvement. Nephro Dr. Quintanilla consulted.  Echo ordered for history of aortic stenosis s/p TAVR.     Echo shows:   CONCLUSIONS:   1. A Transcatheter deployed (TAVR) valve replacement is present in the aortic position The prosthetic valve has normal function.   2. The peak transaortic velocity is 1.85 m/s, peak transaortic gradient is 13.7 mmHg and mean transaortic gradient is 6.8 mmHg with an LVOT/aortic valve VTI ratio of 0.59.   3.Left ventricular systolic function is mildly decreased with an ejection fraction of 47 % by Woodward's method of disks.   4. There is increased LV mass and eccentric hypertrophy.   5. There is moderate (grade 2) left ventricular diastolic dysfunction.   6. Normal right ventricular cavity size, with normal wall thickness, and reduced right ventricular systolic function. Tricuspid annular plane systolic excursion (TAPSE) is 1.5 cm (normal >=1.7 cm).   7. Estimated pulmonary artery systolic pressure is 50 mmHg, consistent with moderate pulmonary hypertension.    Patient also complained of lower back pain, xray of lumbar spine was unremarkable for acute findings. Pt was evaluated by pain management.  PT recommends Home PT    Please note that this a brief summary of hospital course please refer to daily progress notes and consult notes for full course and events. Patient seen and examined at bedside, discussed with medical attending. Patient medically cleared for discharge to.

## 2024-08-02 NOTE — DISCHARGE NOTE PROVIDER - NSDCCPCAREPLAN_GEN_ALL_CORE_FT
PRINCIPAL DISCHARGE DIAGNOSIS  Diagnosis: Acute respiratory failure with hypoxemia  Assessment and Plan of Treatment: You presented with shortness of breath saturating 88% on room air.  This was likely due to pulmonary edema in setting of fluid overload.  You were treated with supplemental oxygen  You were dialyzed which also helped to remove excess fluid from your lungs thus help improve your breathing.  Acute Respiratory Failure is a condition that happens when your lungs cannot get enough oxygen into your blood. ARF can also happen when your lungs cannot get the carbon dioxide out of your blood. A buildup of carbon dioxide in your blood can cause damage to your organs. The decrease in oxygen and the buildup of carbon dioxide can happen at the same time. Acute respiratory failure may develop in minutes, hours, or days.  How is ARF treated? Treatment depends on the cause and how severe your symptoms are. You may need any of the following:  Medicines may be given to open your airways or relieve fluid buildup in your arms or legs. You may also need medicines to treat the cause of your ARF.  Call your local emergency number (911 in the ) or have someone call if:  You have more trouble catching your breath.  You have stopped breathing.  Extra oxygen may be given if your blood oxygen levels are low.        SECONDARY DISCHARGE DIAGNOSES  Diagnosis: ESRD on dialysis  Assessment and Plan of Treatment: You have a history ESRD on hemodialysis   Continue to follow up with your nephrology in out/patient for your scheduled hemodialysis.    Diagnosis: Back pain  Assessment and Plan of Treatment: You were evaluated by pain management team  Xray of your Lumbar spine shows no radiographic osseus spine pathology.  You were treated with medication management.  Follow up with out/patient doctor if your symptoms persist.    Diagnosis: S/P TAVR (transcatheter aortic valve replacement)  Assessment and Plan of Treatment: You have a history of aortic stenosis s/p TAVR  Echo shows ?????????????????????????????    Diagnosis: Acute pulmonary edema  Assessment and Plan of Treatment: Your chest xray showed large bilateral consolidation with moderate pleural effdusion. This may likely be due to excess fluid buid up secondary hemodialysis status.   You were dialyzed to help remove fluid from your body.       PRINCIPAL DISCHARGE DIAGNOSIS  Diagnosis: Acute respiratory failure with hypoxemia  Assessment and Plan of Treatment: You presented with shortness of breath saturating 88% on room air.  This was likely due to pulmonary edema in setting of fluid overload.  You were treated with supplemental oxygen  You were dialyzed which also helped to remove excess fluid from your lungs thus help improve your breathing.  Acute Respiratory Failure is a condition that happens when your lungs cannot get enough oxygen into your blood. ARF can also happen when your lungs cannot get the carbon dioxide out of your blood. A buildup of carbon dioxide in your blood can cause damage to your organs. The decrease in oxygen and the buildup of carbon dioxide can happen at the same time. Acute respiratory failure may develop in minutes, hours, or days.  How is ARF treated? Treatment depends on the cause and how severe your symptoms are. You may need any of the following:  Medicines may be given to open your airways or relieve fluid buildup in your arms or legs. You may also need medicines to treat the cause of your ARF.  Call your local emergency number (911 in the [place]) or have someone call if:  You have more trouble catching your breath.  You have stopped breathing.  Extra oxygen may be given if your blood oxygen levels are low.        SECONDARY DISCHARGE DIAGNOSES  Diagnosis: Acute pulmonary edema  Assessment and Plan of Treatment: Your chest xray showed large bilateral consolidation with moderate pleural effusion. This may likely be due to excess fluid buid up secondary hemodialysis status.   You were dialyzed to help remove fluid from your body.      Diagnosis: ESRD on dialysis  Assessment and Plan of Treatment: You have a history ESRD on hemodialysis   You last Diaylsis session was 8/5/24   Continue to follow up with your nephrology in out/patient for your scheduled hemodialysis.    Diagnosis: Back pain  Assessment and Plan of Treatment: You were evaluated by pain management team  Xray of your Lumbar spine shows no radiographic osseus spine pathology.  You were treated with medication management.  Follow up with out/patient doctor if your symptoms persist.    Diagnosis: S/P TAVR (transcatheter aortic valve replacement)  Assessment and Plan of Treatment: You have a history of aortic stenosis s/p TAVR  Echo shows:   CONCLUSIONS:   1. A Transcatheter deployed (TAVR) valve replacement is present in the aortic position The prosthetic valve has normal function.   2. The peak transaortic velocity is 1.85 m/s, peak transaortic gradient is 13.7 mmHg and mean transaortic gradient is 6.8 mmHg with an LVOT/aortic valve VTI ratio of 0.59.   3.Left ventricular systolic function is mildly decreased with an ejection fraction of 47 % by Woodward's method of disks.   4. There is increased LV mass and eccentric hypertrophy.   5. There is moderate (grade 2) left ventricular diastolic dysfunction.   6. Normal right ventricular cavity size, with normal wall thickness, and reduced right ventricular systolic function. Tricuspid annular plane systolic excursion (TAPSE) is 1.5 cm (normal >=1.7 cm).   7. Estimated pulmonary artery systolic pressure is 50 mmHg, consistent with moderate pulmonary hypertension.

## 2024-08-02 NOTE — DISCHARGE NOTE PROVIDER - ATTENDING DISCHARGE PHYSICAL EXAMINATION:
I, Nuno Forrest, am the last provider completing and signing this document after my resident or NP has started the document. I have personally seen and examined the patient. I have collaborated with and supervised the midlevel practitioner on the discharge service for the patient. I agree with everything as documented by the midlevel practitioner. I have reviewed and made amendments to the documentation where necessary.    I was present with the Resident during the key portions of the history and exam. I discussed the case with the Resident and agree with the findings and plan as documented in the Resident 's note, unless noted below.    My physical exam on day of discharge:  Alert, answering qs appropriately, following commands  no murmurs heard  breathing comfortably  abdomen NT/ND BS+

## 2024-08-03 LAB
ANION GAP SERPL CALC-SCNC: 6 MMOL/L — SIGNIFICANT CHANGE UP (ref 5–17)
BUN SERPL-MCNC: 21 MG/DL — HIGH (ref 7–18)
CALCIUM SERPL-MCNC: 9.1 MG/DL — SIGNIFICANT CHANGE UP (ref 8.4–10.5)
CHLORIDE SERPL-SCNC: 98 MMOL/L — SIGNIFICANT CHANGE UP (ref 96–108)
CO2 SERPL-SCNC: 30 MMOL/L — SIGNIFICANT CHANGE UP (ref 22–31)
CREAT SERPL-MCNC: 4.26 MG/DL — HIGH (ref 0.5–1.3)
EGFR: 14 ML/MIN/1.73M2 — LOW
GLUCOSE SERPL-MCNC: 82 MG/DL — SIGNIFICANT CHANGE UP (ref 70–99)
HCT VFR BLD CALC: 26.1 % — LOW (ref 39–50)
HGB BLD-MCNC: 8.1 G/DL — LOW (ref 13–17)
MCHC RBC-ENTMCNC: 28.2 PG — SIGNIFICANT CHANGE UP (ref 27–34)
MCHC RBC-ENTMCNC: 31 GM/DL — LOW (ref 32–36)
MCV RBC AUTO: 90.9 FL — SIGNIFICANT CHANGE UP (ref 80–100)
NRBC # BLD: 0 /100 WBCS — SIGNIFICANT CHANGE UP (ref 0–0)
PLATELET # BLD AUTO: 139 K/UL — LOW (ref 150–400)
POTASSIUM SERPL-MCNC: 3.9 MMOL/L — SIGNIFICANT CHANGE UP (ref 3.5–5.3)
POTASSIUM SERPL-SCNC: 3.9 MMOL/L — SIGNIFICANT CHANGE UP (ref 3.5–5.3)
RBC # BLD: 2.87 M/UL — LOW (ref 4.2–5.8)
RBC # FLD: 16.9 % — HIGH (ref 10.3–14.5)
SODIUM SERPL-SCNC: 134 MMOL/L — LOW (ref 135–145)
WBC # BLD: 5.38 K/UL — SIGNIFICANT CHANGE UP (ref 3.8–10.5)
WBC # FLD AUTO: 5.38 K/UL — SIGNIFICANT CHANGE UP (ref 3.8–10.5)

## 2024-08-03 PROCEDURE — 71045 X-RAY EXAM CHEST 1 VIEW: CPT | Mod: 26

## 2024-08-03 PROCEDURE — 99233 SBSQ HOSP IP/OBS HIGH 50: CPT

## 2024-08-03 RX ORDER — LORATADINE 10 MG
17 TABLET,DISINTEGRATING ORAL DAILY
Refills: 0 | Status: DISCONTINUED | OUTPATIENT
Start: 2024-08-03 | End: 2024-08-06

## 2024-08-03 RX ORDER — CYCLOBENZAPRINE HCL 10 MG
5 TABLET ORAL DAILY
Refills: 0 | Status: DISCONTINUED | OUTPATIENT
Start: 2024-08-03 | End: 2024-08-06

## 2024-08-03 RX ORDER — SENNOSIDES 8.6 MG/1
2 TABLET ORAL AT BEDTIME
Refills: 0 | Status: DISCONTINUED | OUTPATIENT
Start: 2024-08-03 | End: 2024-08-06

## 2024-08-03 RX ADMIN — Medication 500 MILLIGRAM(S): at 21:19

## 2024-08-03 RX ADMIN — Medication 1000 MILLIGRAM(S): at 13:25

## 2024-08-03 RX ADMIN — LIDOCAINE 5% 1 PATCH: 5 CREAM TOPICAL at 19:19

## 2024-08-03 RX ADMIN — Medication 25 MILLIGRAM(S): at 11:15

## 2024-08-03 RX ADMIN — LIDOCAINE 5% 1 PATCH: 5 CREAM TOPICAL at 02:13

## 2024-08-03 RX ADMIN — HEPARIN SODIUM 5000 UNIT(S): 1000 INJECTION, SOLUTION INTRAVENOUS; SUBCUTANEOUS at 21:16

## 2024-08-03 RX ADMIN — LIDOCAINE 5% 1 PATCH: 5 CREAM TOPICAL at 13:25

## 2024-08-03 RX ADMIN — HEPARIN SODIUM 5000 UNIT(S): 1000 INJECTION, SOLUTION INTRAVENOUS; SUBCUTANEOUS at 05:28

## 2024-08-03 RX ADMIN — Medication 17 GRAM(S): at 13:26

## 2024-08-03 RX ADMIN — Medication 25 MILLIGRAM(S): at 10:23

## 2024-08-03 RX ADMIN — Medication 500 MILLIGRAM(S): at 13:25

## 2024-08-03 RX ADMIN — Medication 1000 MILLIGRAM(S): at 05:28

## 2024-08-03 RX ADMIN — Medication 5 MILLIGRAM(S): at 21:17

## 2024-08-03 RX ADMIN — SENNOSIDES 2 TABLET(S): 8.6 TABLET ORAL at 21:16

## 2024-08-03 RX ADMIN — HEPARIN SODIUM 5000 UNIT(S): 1000 INJECTION, SOLUTION INTRAVENOUS; SUBCUTANEOUS at 13:26

## 2024-08-03 RX ADMIN — Medication 500 MILLIGRAM(S): at 05:27

## 2024-08-03 RX ADMIN — Medication 1000 MILLIGRAM(S): at 21:16

## 2024-08-03 RX ADMIN — GABAPENTIN 300 MILLIGRAM(S): 400 CAPSULE ORAL at 21:16

## 2024-08-03 RX ADMIN — Medication 1000 MILLIGRAM(S): at 05:58

## 2024-08-03 RX ADMIN — Medication 1000 MILLIGRAM(S): at 14:15

## 2024-08-03 NOTE — PROGRESS NOTE ADULT - TIME BILLING
-Review of hospital course, labs, vitals, medical records  -Bedside exam and interview  -Discussed plan and coordinated care with ACP/housestaff, family, specialists, /  -Documenting the encounter.

## 2024-08-04 PROCEDURE — 99232 SBSQ HOSP IP/OBS MODERATE 35: CPT

## 2024-08-04 RX ADMIN — SENNOSIDES 2 TABLET(S): 8.6 TABLET ORAL at 21:35

## 2024-08-04 RX ADMIN — LIDOCAINE 5% 1 PATCH: 5 CREAM TOPICAL at 19:52

## 2024-08-04 RX ADMIN — Medication 1000 MILLIGRAM(S): at 21:35

## 2024-08-04 RX ADMIN — Medication 1000 MILLIGRAM(S): at 06:00

## 2024-08-04 RX ADMIN — Medication 500 MILLIGRAM(S): at 05:23

## 2024-08-04 RX ADMIN — HEPARIN SODIUM 5000 UNIT(S): 1000 INJECTION, SOLUTION INTRAVENOUS; SUBCUTANEOUS at 05:23

## 2024-08-04 RX ADMIN — GABAPENTIN 300 MILLIGRAM(S): 400 CAPSULE ORAL at 21:35

## 2024-08-04 RX ADMIN — Medication 1000 MILLIGRAM(S): at 13:49

## 2024-08-04 RX ADMIN — Medication 5 MILLIGRAM(S): at 21:35

## 2024-08-04 RX ADMIN — Medication 1000 MILLIGRAM(S): at 05:23

## 2024-08-04 RX ADMIN — Medication 500 MILLIGRAM(S): at 21:34

## 2024-08-04 RX ADMIN — Medication 500 MILLIGRAM(S): at 13:45

## 2024-08-04 RX ADMIN — LIDOCAINE 5% 1 PATCH: 5 CREAM TOPICAL at 00:55

## 2024-08-04 RX ADMIN — Medication 1000 MILLIGRAM(S): at 14:30

## 2024-08-04 RX ADMIN — HEPARIN SODIUM 5000 UNIT(S): 1000 INJECTION, SOLUTION INTRAVENOUS; SUBCUTANEOUS at 21:35

## 2024-08-04 RX ADMIN — Medication 1000 MILLIGRAM(S): at 22:30

## 2024-08-04 RX ADMIN — HEPARIN SODIUM 5000 UNIT(S): 1000 INJECTION, SOLUTION INTRAVENOUS; SUBCUTANEOUS at 13:49

## 2024-08-04 RX ADMIN — Medication 5 MILLIGRAM(S): at 13:45

## 2024-08-04 RX ADMIN — Medication 1000 MILLIGRAM(S): at 00:54

## 2024-08-04 RX ADMIN — Medication 17 GRAM(S): at 13:46

## 2024-08-04 RX ADMIN — LIDOCAINE 5% 1 PATCH: 5 CREAM TOPICAL at 13:49

## 2024-08-05 VITALS
RESPIRATION RATE: 16 BRPM | TEMPERATURE: 98 F | HEART RATE: 68 BPM | SYSTOLIC BLOOD PRESSURE: 129 MMHG | DIASTOLIC BLOOD PRESSURE: 71 MMHG | OXYGEN SATURATION: 98 %

## 2024-08-05 LAB
ANION GAP SERPL CALC-SCNC: 10 MMOL/L — SIGNIFICANT CHANGE UP (ref 5–17)
BUN SERPL-MCNC: 47 MG/DL — HIGH (ref 7–18)
CALCIUM SERPL-MCNC: 8.2 MG/DL — LOW (ref 8.4–10.5)
CHLORIDE SERPL-SCNC: 96 MMOL/L — SIGNIFICANT CHANGE UP (ref 96–108)
CO2 SERPL-SCNC: 25 MMOL/L — SIGNIFICANT CHANGE UP (ref 22–31)
CREAT SERPL-MCNC: 7.24 MG/DL — HIGH (ref 0.5–1.3)
EGFR: 7 ML/MIN/1.73M2 — LOW
GLUCOSE SERPL-MCNC: 95 MG/DL — SIGNIFICANT CHANGE UP (ref 70–99)
HCT VFR BLD CALC: 23.1 % — LOW (ref 39–50)
HGB BLD-MCNC: 7.1 G/DL — LOW (ref 13–17)
MAGNESIUM SERPL-MCNC: 2.2 MG/DL — SIGNIFICANT CHANGE UP (ref 1.6–2.6)
MCHC RBC-ENTMCNC: 28.2 PG — SIGNIFICANT CHANGE UP (ref 27–34)
MCHC RBC-ENTMCNC: 30.7 GM/DL — LOW (ref 32–36)
MCV RBC AUTO: 91.7 FL — SIGNIFICANT CHANGE UP (ref 80–100)
NRBC # BLD: 0 /100 WBCS — SIGNIFICANT CHANGE UP (ref 0–0)
PHOSPHATE SERPL-MCNC: 4.9 MG/DL — HIGH (ref 2.5–4.5)
PLATELET # BLD AUTO: 142 K/UL — LOW (ref 150–400)
POTASSIUM SERPL-MCNC: 4.4 MMOL/L — SIGNIFICANT CHANGE UP (ref 3.5–5.3)
POTASSIUM SERPL-SCNC: 4.4 MMOL/L — SIGNIFICANT CHANGE UP (ref 3.5–5.3)
RBC # BLD: 2.52 M/UL — LOW (ref 4.2–5.8)
RBC # FLD: 17.1 % — HIGH (ref 10.3–14.5)
SODIUM SERPL-SCNC: 131 MMOL/L — LOW (ref 135–145)
WBC # BLD: 5.61 K/UL — SIGNIFICANT CHANGE UP (ref 3.8–10.5)
WBC # FLD AUTO: 5.61 K/UL — SIGNIFICANT CHANGE UP (ref 3.8–10.5)

## 2024-08-05 PROCEDURE — 99239 HOSP IP/OBS DSCHRG MGMT >30: CPT

## 2024-08-05 RX ORDER — CYCLOBENZAPRINE HCL 10 MG
1 TABLET ORAL
Qty: 3 | Refills: 0
Start: 2024-08-05 | End: 2024-08-07

## 2024-08-05 RX ORDER — GABAPENTIN 400 MG/1
1 CAPSULE ORAL
Qty: 0 | Refills: 0 | DISCHARGE
Start: 2024-08-05

## 2024-08-05 RX ORDER — GABAPENTIN 400 MG/1
1 CAPSULE ORAL
Qty: 90 | Refills: 0
Start: 2024-08-05 | End: 2024-09-03

## 2024-08-05 RX ORDER — ACETAMINOPHEN 500 MG
2 TABLET ORAL
Qty: 0 | Refills: 0 | DISCHARGE
Start: 2024-08-05

## 2024-08-05 RX ORDER — METHOCARBAMOL 500 MG
1 TABLET ORAL
Qty: 9 | Refills: 0
Start: 2024-08-05 | End: 2024-08-07

## 2024-08-05 RX ORDER — SENNOSIDES 8.6 MG/1
2 TABLET ORAL
Qty: 0 | Refills: 0 | DISCHARGE
Start: 2024-08-05

## 2024-08-05 RX ORDER — LORATADINE 10 MG
17 TABLET,DISINTEGRATING ORAL
Qty: 0 | Refills: 0 | DISCHARGE
Start: 2024-08-05

## 2024-08-05 RX ORDER — MECLIZINE 12.5 MG/1
1 TABLET ORAL
Qty: 21 | Refills: 0
Start: 2024-08-05 | End: 2024-08-11

## 2024-08-05 RX ORDER — MECLIZINE 12.5 MG/1
12.5 TABLET ORAL DAILY
Refills: 0 | Status: DISCONTINUED | OUTPATIENT
Start: 2024-08-05 | End: 2024-08-06

## 2024-08-05 RX ADMIN — HEPARIN SODIUM 5000 UNIT(S): 1000 INJECTION, SOLUTION INTRAVENOUS; SUBCUTANEOUS at 05:35

## 2024-08-05 RX ADMIN — EPOETIN ALFA 10000 UNIT(S): 3000 SOLUTION INTRAVENOUS; SUBCUTANEOUS at 13:21

## 2024-08-05 RX ADMIN — Medication 1000 MILLIGRAM(S): at 05:36

## 2024-08-05 RX ADMIN — Medication 1000 MILLIGRAM(S): at 21:44

## 2024-08-05 RX ADMIN — MECLIZINE 12.5 MILLIGRAM(S): 12.5 TABLET ORAL at 17:17

## 2024-08-05 RX ADMIN — LIDOCAINE 5% 1 PATCH: 5 CREAM TOPICAL at 01:20

## 2024-08-05 RX ADMIN — Medication 1000 MILLIGRAM(S): at 06:20

## 2024-08-05 RX ADMIN — Medication 500 MILLIGRAM(S): at 05:36

## 2024-08-05 RX ADMIN — Medication 5 MILLIGRAM(S): at 17:17

## 2024-08-05 RX ADMIN — GABAPENTIN 300 MILLIGRAM(S): 400 CAPSULE ORAL at 21:14

## 2024-08-05 RX ADMIN — Medication 500 MILLIGRAM(S): at 17:16

## 2024-08-05 RX ADMIN — HEPARIN SODIUM 5000 UNIT(S): 1000 INJECTION, SOLUTION INTRAVENOUS; SUBCUTANEOUS at 21:14

## 2024-08-05 RX ADMIN — Medication 1000 MILLIGRAM(S): at 21:14

## 2024-08-05 NOTE — PROGRESS NOTE ADULT - GASTROINTESTINAL
negative
You can access the FollowMyHealth Patient Portal offered by [facility] by registering at the following website: [email or web address]. By joining [employer]’s FollowMyHealth portal, you will also be able to view your health information using other applications (apps) compatible with our system.

## 2024-08-05 NOTE — DISCHARGE NOTE NURSING/CASE MANAGEMENT/SOCIAL WORK - PATIENT PORTAL LINK FT
You can access the FollowMyHealth Patient Portal offered by [facility] by registering at the following website: [email or web address]. By joining [employer]’s FollowMyHealth portal, you will also be able to view your health information using other applications (apps) compatible with our system.

## 2024-08-05 NOTE — PROGRESS NOTE ADULT - SUBJECTIVE AND OBJECTIVE BOX
MARCELLO SHETTY  73y  Patient is a 73y old  Male who presents with a chief complaint of AHRF 2/2 Pulm edema (04 Aug 2024 19:47)    HPI: Seen and examined. ESRD on HD, treated today without incident.    HEALTH ISSUES - PROBLEM Dx:  ESRD on dialysis    S/P TAVR (transcatheter aortic valve replacement)    Prophylactic measure    Acute respiratory failure with hypoxia    Back pain    Discharge planning issues          MEDICATIONS  (STANDING):  acetaminophen     Tablet .. 1000 milliGRAM(s) Oral every 8 hours  epoetin marissa-epbx (RETACRIT) Injectable 50094 Unit(s) IV Push <User Schedule>  gabapentin 300 milliGRAM(s) Oral at bedtime  heparin   Injectable 5000 Unit(s) SubCutaneous every 8 hours  lidocaine   4% Patch 1 Patch Transdermal every 24 hours  meclizine 12.5 milliGRAM(s) Oral daily  polyethylene glycol 3350 17 Gram(s) Oral daily  senna 2 Tablet(s) Oral at bedtime    MEDICATIONS  (PRN):  acetaminophen     Tablet .. 650 milliGRAM(s) Oral every 6 hours PRN Temp greater or equal to 38C (100.4F), Mild Pain (1 - 3)  aluminum hydroxide/magnesium hydroxide/simethicone Suspension 30 milliLiter(s) Oral every 4 hours PRN Dyspepsia  cyclobenzaprine 5 milliGRAM(s) Oral daily PRN Muscle Spasm  melatonin 5 milliGRAM(s) Oral at bedtime PRN Insomnia  ondansetron Injectable 4 milliGRAM(s) IV Push every 8 hours PRN Nausea and/or Vomiting  sodium chloride 0.9% Bolus. 100 milliLiter(s) IV Bolus every 5 minutes PRN SBP LESS THAN or EQUAL to 90 mmHg  traMADol 25 milliGRAM(s) Oral every 8 hours PRN Moderate Pain (4 - 6)  traMADol 50 milliGRAM(s) Oral every 8 hours PRN Severe Pain (7 - 10)    Vital Signs Last 24 Hrs  T(C): 36.7 (05 Aug 2024 14:56), Max: 36.7 (04 Aug 2024 21:18)  T(F): 98.1 (05 Aug 2024 14:56), Max: 98.1 (04 Aug 2024 21:18)  HR: 79 (05 Aug 2024 14:56) (72 - 80)  BP: 132/77 (05 Aug 2024 14:56) (126/72 - 144/84)  BP(mean): 90 (05 Aug 2024 05:01) (90 - 90)  RR: 17 (05 Aug 2024 14:56) (17 - 18)  SpO2: 95% (05 Aug 2024 14:56) (95% - 100%)    Parameters below as of 05 Aug 2024 14:56  Patient On (Oxygen Delivery Method): room air      Daily     Daily Weight in k.5 (05 Aug 2024 13:27)    PHYSICAL EXAM:  Constitutional:  He appears comfortable and not distressed. Not diaphoretic.    Neck:  The thyroid is normal. Trachea is midline.     Respiratory: The lungs are clear to auscultation. No dullness and expansion is normal.    Cardiovascular: S1 and S2 are normal. No mummurs, rubs or gallops are present.    Gastrointestinal: The abdomen is soft. No tenderness is present. No masses are present. Bowel sounds are normal.    Genitourinary: The bladder is not distended. No CVA tenderness is present.    Extremities: No edema is noted. No deformities are present.    Neurological: Cognition is normal. Tone, power and sensation are normal.     Skin: No lesions are seen  or palpated.    Lymph Nodes: No lymphadenopathy is present.    Psychiatric: Mood is appropriate. No hallucinations or flight of ideas are noted.                              7.1    5.61  )-----------( 142      ( 05 Aug 2024 11:26 )             23.1     08-05    131<L>  |  96  |  47<H>  ----------------------------<  95  4.4   |  25  |  7.24<H>    Ca    8.2<L>      05 Aug 2024 11:26  Phos  4.9     08-05  Mg     2.2     08-05      Urinalysis Basic - ( 05 Aug 2024 11:26 )    Color: x / Appearance: x / SG: x / pH: x  Gluc: 95 mg/dL / Ketone: x  / Bili: x / Urobili: x   Blood: x / Protein: x / Nitrite: x   Leuk Esterase: x / RBC: x / WBC x   Sq Epi: x / Non Sq Epi: x / Bacteria: x    
MARCELLO SHETTY  73y  Patient is a 73y old  Male who presents with a chief complaint of AHRF 2/2 Pulm edema (04 Aug 2024 11:18)    HPI:  Seen and examined.  No new complaints.    HEALTH ISSUES - PROBLEM Dx:  ESRD on dialysis    S/P TAVR (transcatheter aortic valve replacement)    Prophylactic measure    Acute respiratory failure with hypoxia    Back pain    Discharge planning issues          MEDICATIONS  (STANDING):  acetaminophen     Tablet .. 1000 milliGRAM(s) Oral every 8 hours  epoetin marissa-epbx (RETACRIT) Injectable 79878 Unit(s) IV Push <User Schedule>  gabapentin 300 milliGRAM(s) Oral at bedtime  heparin   Injectable 5000 Unit(s) SubCutaneous every 8 hours  lidocaine   4% Patch 1 Patch Transdermal every 24 hours  methocarbamol 500 milliGRAM(s) Oral every 8 hours  polyethylene glycol 3350 17 Gram(s) Oral daily  senna 2 Tablet(s) Oral at bedtime    MEDICATIONS  (PRN):  acetaminophen     Tablet .. 650 milliGRAM(s) Oral every 6 hours PRN Temp greater or equal to 38C (100.4F), Mild Pain (1 - 3)  aluminum hydroxide/magnesium hydroxide/simethicone Suspension 30 milliLiter(s) Oral every 4 hours PRN Dyspepsia  cyclobenzaprine 5 milliGRAM(s) Oral daily PRN Muscle Spasm  melatonin 5 milliGRAM(s) Oral at bedtime PRN Insomnia  ondansetron Injectable 4 milliGRAM(s) IV Push every 8 hours PRN Nausea and/or Vomiting  sodium chloride 0.9% Bolus. 100 milliLiter(s) IV Bolus every 5 minutes PRN SBP LESS THAN or EQUAL to 90 mmHg  traMADol 25 milliGRAM(s) Oral every 8 hours PRN Moderate Pain (4 - 6)  traMADol 50 milliGRAM(s) Oral every 8 hours PRN Severe Pain (7 - 10)    Vital Signs Last 24 Hrs  T(C): 36.6 (04 Aug 2024 12:11), Max: 36.6 (04 Aug 2024 12:11)  T(F): 97.9 (04 Aug 2024 12:11), Max: 97.9 (04 Aug 2024 12:11)  HR: 79 (04 Aug 2024 12:11) (75 - 79)  BP: 147/77 (04 Aug 2024 12:11) (143/80 - 149/79)  BP(mean): --  RR: 18 (04 Aug 2024 12:11) (17 - 18)  SpO2: 92% (04 Aug 2024 12:11) (92% - 96%)    Parameters below as of 04 Aug 2024 12:11  Patient On (Oxygen Delivery Method): room air      Daily     Daily Weight in k.6 (04 Aug 2024 05:15)    PHYSICAL EXAM:  Constitutional: He  appears comfortable and not distressed. Not diaphoretic.    Neck:  The thyroid is normal. Trachea is midline.     Breasts: Normal examination.    Respiratory: The lungs are clear to auscultation. No dullness and expansion is normal.    Cardiovascular: S1 and S2 are normal. No mummurs, rubs or gallops are present.    Gastrointestinal: The abdomen is soft. No tenderness is present. No masses are present. Bowel sounds are normal.    Genitourinary: The bladder is not distended. No CVA tenderness is present.    Extremities: No edema is noted. No deformities are present.    Neurological: Cognition is normal. Tone, power and sensation are normal. Gait is steady.    Skin: No leasions are seen  or palpated.    Lymph Nodes: No lymphadenopathy is present.    Psychiatric: Mood is appropriate. No hallucinations or flight of ideas are noted.                              8.1    5.38  )-----------( 139      ( 03 Aug 2024 07:07 )             26.1     08-03    134<L>  |  98  |  21<H>  ----------------------------<  82  3.9   |  30  |  4.26<H>    Ca    9.1      03 Aug 2024 07:07    
Interval of present illness: No acute events overnight. Pt seen at bedside. Still with back pain    REVIEW OF SYSTEMS: as above    O:  Vital Signs Last 24 Hrs  T(C): 36.3 (03 Aug 2024 13:09), Max: 37 (02 Aug 2024 14:30)  T(F): 97.4 (03 Aug 2024 13:09), Max: 98.6 (02 Aug 2024 14:30)  HR: 73 (03 Aug 2024 13:09) (64 - 91)  BP: 145/80 (03 Aug 2024 13:09) (133/75 - 145/80)  BP(mean): --  RR: 17 (03 Aug 2024 13:09) (17 - 18)  SpO2: 96% (03 Aug 2024 13:09) (95% - 98%)    Parameters below as of 03 Aug 2024 13:09  Patient On (Oxygen Delivery Method): room air        Gen: NAD  Neuro: alert, answering qs appropriately, moves all extremities  HEENT: anicteric, moist oral mucosa  Neck: supple  Cards: no murmurs appreciated  Pulm: good inspiratory effort, breathing comfortably  Abd: soft, NT/ND, BS+  Back: tenderness to palpation of R lumbar paraspinal muscles; no spinal point tenderness  Ext: no edema  Skin: warm, dry      acetaminophen     Tablet .. 650 milliGRAM(s) Oral every 6 hours PRN  acetaminophen     Tablet .. 1000 milliGRAM(s) Oral every 8 hours  aluminum hydroxide/magnesium hydroxide/simethicone Suspension 30 milliLiter(s) Oral every 4 hours PRN  cyclobenzaprine 5 milliGRAM(s) Oral daily PRN  epoetin marissa-epbx (RETACRIT) Injectable 92988 Unit(s) IV Push <User Schedule>  gabapentin 300 milliGRAM(s) Oral at bedtime  heparin   Injectable 5000 Unit(s) SubCutaneous every 8 hours  lidocaine   4% Patch 1 Patch Transdermal every 24 hours  melatonin 5 milliGRAM(s) Oral at bedtime PRN  methocarbamol 500 milliGRAM(s) Oral every 8 hours  ondansetron Injectable 4 milliGRAM(s) IV Push every 8 hours PRN  polyethylene glycol 3350 17 Gram(s) Oral daily  senna 2 Tablet(s) Oral at bedtime  sodium chloride 0.9% Bolus. 100 milliLiter(s) IV Bolus every 5 minutes PRN  traMADol 25 milliGRAM(s) Oral every 8 hours PRN  traMADol 50 milliGRAM(s) Oral every 8 hours PRN                            8.1    5.38  )-----------( 139      ( 03 Aug 2024 07:07 )             26.1       08-03    134<L>  |  98  |  21<H>  ----------------------------<  82  3.9   |  30  |  4.26<H>    Ca    9.1      03 Aug 2024 07:07  Phos  3.3     08-02  Mg     2.2     08-02    TPro  7.2  /  Alb  2.5<L>  /  TBili  0.7  /  DBili  x   /  AST  15  /  ALT  7<L>  /  AlkPhos  99  08-02  
MARCELLO SHETTY  73y  Patient is a 73y old  Male who presents with a chief complaint of AHRF 2/2 Pulm edema (02 Aug 2024 18:07)    HPI:  Seen and examined. S/P HD yesterday. No dyspnea today.    HEALTH ISSUES - PROBLEM Dx:  ESRD on dialysis    S/P TAVR (transcatheter aortic valve replacement)    Prophylactic measure    Acute respiratory failure with hypoxia    Back pain    Discharge planning issues          MEDICATIONS  (STANDING):  acetaminophen     Tablet .. 1000 milliGRAM(s) Oral every 8 hours  epoetin marissa-epbx (RETACRIT) Injectable 74896 Unit(s) IV Push <User Schedule>  gabapentin 300 milliGRAM(s) Oral at bedtime  heparin   Injectable 5000 Unit(s) SubCutaneous every 8 hours  lidocaine   4% Patch 1 Patch Transdermal every 24 hours  methocarbamol 500 milliGRAM(s) Oral every 8 hours  polyethylene glycol 3350 17 Gram(s) Oral daily  senna 2 Tablet(s) Oral at bedtime    MEDICATIONS  (PRN):  acetaminophen     Tablet .. 650 milliGRAM(s) Oral every 6 hours PRN Temp greater or equal to 38C (100.4F), Mild Pain (1 - 3)  aluminum hydroxide/magnesium hydroxide/simethicone Suspension 30 milliLiter(s) Oral every 4 hours PRN Dyspepsia  cyclobenzaprine 5 milliGRAM(s) Oral daily PRN Muscle Spasm  melatonin 5 milliGRAM(s) Oral at bedtime PRN Insomnia  ondansetron Injectable 4 milliGRAM(s) IV Push every 8 hours PRN Nausea and/or Vomiting  sodium chloride 0.9% Bolus. 100 milliLiter(s) IV Bolus every 5 minutes PRN SBP LESS THAN or EQUAL to 90 mmHg  traMADol 25 milliGRAM(s) Oral every 8 hours PRN Moderate Pain (4 - 6)  traMADol 50 milliGRAM(s) Oral every 8 hours PRN Severe Pain (7 - 10)    Vital Signs Last 24 Hrs  T(C): 36.4 (03 Aug 2024 05:05), Max: 37 (02 Aug 2024 14:30)  T(F): 97.5 (03 Aug 2024 05:05), Max: 98.6 (02 Aug 2024 14:30)  HR: 64 (03 Aug 2024 05:05) (64 - 91)  BP: 135/66 (03 Aug 2024 05:05) (133/75 - 144/79)  BP(mean): --  RR: 18 (03 Aug 2024 05:05) (17 - 18)  SpO2: 95% (03 Aug 2024 05:05) (95% - 100%)    Parameters below as of 02 Aug 2024 20:28  Patient On (Oxygen Delivery Method): room air      Daily     Daily Weight in k (03 Aug 2024 05:05)    PHYSICAL EXAM:  Constitutional: He appears comfortable and not distressed. Not diaphoretic.    Neck:  The thyroid is normal. Trachea is midline.     Breasts: Normal examination.    Respiratory: The lungs are clear to auscultation. No dullness and expansion is normal.    Cardiovascular: S1 and S2 are normal. No mummurs, rubs or gallops are present.    Gastrointestinal: The abdomen is soft. No tenderness is present. No masses are present. Bowel sounds are normal.    Genitourinary: The bladder is not distended. No CVA tenderness is present.    Extremities: No edema is noted. No deformities are present.    Neurological: Cognition is normal. Tone, power and sensation are normal. Gait is steady.    Skin: No leasions are seen  or palpated.    Lymph Nodes: No lymphadenopathy is present.    Psychiatric: Mood is appropriate. No hallucinations or flight of ideas are noted.                              8.1    5.38  )-----------( 139      ( 03 Aug 2024 07:07 )             26.1     08-03    134<L>  |  98  |  21<H>  ----------------------------<  82  3.9   |  30  |  4.26<H>    Ca    9.1      03 Aug 2024 07:07  Phos  3.3     08-02  Mg     2.2     08-02    TPro  7.2  /  Alb  2.5<L>  /  TBili  0.7  /  DBili  x   /  AST  15  /  ALT  7<L>  /  AlkPhos  99  08-02  
Interval of present illness: No acute events overnight. Pt seen at bedside. Still with back pain    REVIEW OF SYSTEMS: as above    O:  Vital Signs Last 24 Hrs  T(C): 36.3 (03 Aug 2024 13:09), Max: 37 (02 Aug 2024 14:30)  T(F): 97.4 (03 Aug 2024 13:09), Max: 98.6 (02 Aug 2024 14:30)  HR: 73 (03 Aug 2024 13:09) (64 - 91)  BP: 145/80 (03 Aug 2024 13:09) (133/75 - 145/80)  BP(mean): --  RR: 17 (03 Aug 2024 13:09) (17 - 18)  SpO2: 96% (03 Aug 2024 13:09) (95% - 98%)    Parameters below as of 03 Aug 2024 13:09  Patient On (Oxygen Delivery Method): room air        Gen: NAD  Neuro: alert, answering qs appropriately, moves all extremities  HEENT: anicteric, moist oral mucosa  Neck: supple  Cards: no murmurs appreciated  Pulm: good inspiratory effort, breathing comfortably  Abd: soft, NT/ND, BS+  Back: tenderness to palpation of R lumbar paraspinal muscles; no spinal point tenderness  Ext: no edema  Skin: warm, dry      acetaminophen     Tablet .. 650 milliGRAM(s) Oral every 6 hours PRN  acetaminophen     Tablet .. 1000 milliGRAM(s) Oral every 8 hours  aluminum hydroxide/magnesium hydroxide/simethicone Suspension 30 milliLiter(s) Oral every 4 hours PRN  cyclobenzaprine 5 milliGRAM(s) Oral daily PRN  epoetin marissa-epbx (RETACRIT) Injectable 57571 Unit(s) IV Push <User Schedule>  gabapentin 300 milliGRAM(s) Oral at bedtime  heparin   Injectable 5000 Unit(s) SubCutaneous every 8 hours  lidocaine   4% Patch 1 Patch Transdermal every 24 hours  melatonin 5 milliGRAM(s) Oral at bedtime PRN  methocarbamol 500 milliGRAM(s) Oral every 8 hours  ondansetron Injectable 4 milliGRAM(s) IV Push every 8 hours PRN  polyethylene glycol 3350 17 Gram(s) Oral daily  senna 2 Tablet(s) Oral at bedtime  sodium chloride 0.9% Bolus. 100 milliLiter(s) IV Bolus every 5 minutes PRN  traMADol 25 milliGRAM(s) Oral every 8 hours PRN  traMADol 50 milliGRAM(s) Oral every 8 hours PRN                            8.1    5.38  )-----------( 139      ( 03 Aug 2024 07:07 )             26.1       08-03    134<L>  |  98  |  21<H>  ----------------------------<  82  3.9   |  30  |  4.26<H>    Ca    9.1      03 Aug 2024 07:07  Phos  3.3     08-02  Mg     2.2     08-02    TPro  7.2  /  Alb  2.5<L>  /  TBili  0.7  /  DBili  x   /  AST  15  /  ALT  7<L>  /  AlkPhos  99  08-02  
NP Note discussed with  primary attending    Patient is a 73y old  Male who presents with a chief complaint of AHRF 2/2 Pulm edema (02 Aug 2024 12:50)      INTERVAL HPI/OVERNIGHT EVENTS: no new complaints    MEDICATIONS  (STANDING):  acetaminophen     Tablet .. 1000 milliGRAM(s) Oral every 8 hours  epoetin marissa-epbx (RETACRIT) Injectable 17090 Unit(s) IV Push <User Schedule>  gabapentin 300 milliGRAM(s) Oral at bedtime  heparin   Injectable 5000 Unit(s) SubCutaneous every 8 hours  lidocaine   4% Patch 1 Patch Transdermal every 24 hours  methocarbamol 500 milliGRAM(s) Oral every 8 hours    MEDICATIONS  (PRN):  acetaminophen     Tablet .. 650 milliGRAM(s) Oral every 6 hours PRN Temp greater or equal to 38C (100.4F), Mild Pain (1 - 3)  aluminum hydroxide/magnesium hydroxide/simethicone Suspension 30 milliLiter(s) Oral every 4 hours PRN Dyspepsia  melatonin 5 milliGRAM(s) Oral at bedtime PRN Insomnia  ondansetron Injectable 4 milliGRAM(s) IV Push every 8 hours PRN Nausea and/or Vomiting  sodium chloride 0.9% Bolus. 100 milliLiter(s) IV Bolus every 5 minutes PRN SBP LESS THAN or EQUAL to 90 mmHg  traMADol 25 milliGRAM(s) Oral every 8 hours PRN Moderate Pain (4 - 6)  traMADol 50 milliGRAM(s) Oral every 8 hours PRN Severe Pain (7 - 10)      __________________________________________________  REVIEW OF SYSTEMS:    CONSTITUTIONAL: No fever,   EYES: no acute visual disturbances  NECK: No pain or stiffness  RESPIRATORY: No cough; No shortness of breath  CARDIOVASCULAR: No chest pain, no palpitations  GASTROINTESTINAL: No pain. No nausea or vomiting; No diarrhea   NEUROLOGICAL: No headache or numbness, no tremors  MUSCULOSKELETAL: No joint pain, no muscle pain  GENITOURINARY: no dysuria, no frequency, no hesitancy  PSYCHIATRY: no depression , no anxiety  ALL OTHER  ROS negative        Vital Signs Last 24 Hrs  T(C): 37 (02 Aug 2024 14:30), Max: 37 (02 Aug 2024 14:30)  T(F): 98.6 (02 Aug 2024 14:30), Max: 98.6 (02 Aug 2024 14:30)  HR: 91 (02 Aug 2024 14:30) (72 - 91)  BP: 133/75 (02 Aug 2024 14:30) (123/68 - 150/83)  BP(mean): --  RR: 18 (02 Aug 2024 14:30) (17 - 24)  SpO2: 97% (02 Aug 2024 14:30) (92% - 100%)    Parameters below as of 02 Aug 2024 14:30  Patient On (Oxygen Delivery Method): nasal cannula  O2 Flow (L/min): 2      ________________________________________________  PHYSICAL EXAM:  GENERAL: NAD  HEENT: Normocephalic;  conjunctivae and sclerae clear; moist mucous membranes;   NECK : supple  CHEST/LUNG: Clear to auscultation bilaterally with good air entry   HEART: S1 S2  regular; no murmurs, gallops or rubs  ABDOMEN: Soft, Nontender, Nondistended; Bowel sounds present  EXTREMITIES: no cyanosis; no edema; no calf tenderness  SKIN: warm and dry; no rash  NERVOUS SYSTEM:  Awake and alert; Oriented  to place, person and time ; no new deficits    _________________________________________________  LABS:                        8.0    2.30  )-----------( 123      ( 02 Aug 2024 05:14 )             26.1     08-02    134<L>  |  100  |  28<H>  ----------------------------<  150<H>  4.7   |  28  |  6.05<H>    Ca    9.0      02 Aug 2024 05:14  Phos  3.3     08-02  Mg     2.2     08-02    TPro  7.2  /  Alb  2.5<L>  /  TBili  0.7  /  DBili  x   /  AST  15  /  ALT  7<L>  /  AlkPhos  99  08-02      Urinalysis Basic - ( 02 Aug 2024 05:14 )    Color: x / Appearance: x / SG: x / pH: x  Gluc: 150 mg/dL / Ketone: x  / Bili: x / Urobili: x   Blood: x / Protein: x / Nitrite: x   Leuk Esterase: x / RBC: x / WBC x   Sq Epi: x / Non Sq Epi: x / Bacteria: x      CAPILLARY BLOOD GLUCOSE            RADIOLOGY & ADDITIONAL TESTS:  < from: Xray Chest 1 View- PORTABLE-Urgent (Xray Chest 1 View- PORTABLE-Urgent .) (08.01.24 @ 20:51) >  IMPRESSION:  Bilateral perihilar reticular and patchy lung opacities,   possibly pulmonary edema. Other underlying pathology including, but not   limited to, pneumonia is not excluded.    Bilateral small loculated pleural effusions with likely associated   passive atelectasis.    --- End of Report ---      < end of copied text >  < from: Xray Lumbar Spine AP + Lateral (08.01.24 @ 18:15) >    IMPRESSION:  No  radiographic lumbar spine osseous pathology.  If pain persist despite conservative therapy and occult fracture or   spinal canal/foraminal nerve root compression clinically suspected,   further assessment with CT or MRI recommended.    --- End of Report ---    < end of copied text >    Imaging Personally Reviewed:  YES    Consultant(s) Notes Reviewed:   YES    Care Discussed with Consultants :     Plan of care was discussed with patient and /or primary care giver; all questions and concerns were addressed and care was aligned with patient's wishes.

## 2024-08-05 NOTE — DISCHARGE NOTE NURSING/CASE MANAGEMENT/SOCIAL WORK - [facility]SDCFUADDAPPT_GEN_ALL_CORE_FT
APPTS ARE READY TO BE MADE: [X] YES    Best Family or Patient Contact (if needed):    Additional Information about above appointments (if needed):    1: PCP: Tyler Resendiz   2:   3:     Other comments or requests:

## 2024-08-05 NOTE — PROGRESS NOTE ADULT - REASON FOR ADMISSION
AHRF 2/2 Pulm edema

## 2024-08-06 PROCEDURE — 85025 COMPLETE CBC W/AUTO DIFF WBC: CPT

## 2024-08-06 PROCEDURE — 71045 X-RAY EXAM CHEST 1 VIEW: CPT

## 2024-08-06 PROCEDURE — 83735 ASSAY OF MAGNESIUM: CPT

## 2024-08-06 PROCEDURE — 86706 HEP B SURFACE ANTIBODY: CPT

## 2024-08-06 PROCEDURE — 80053 COMPREHEN METABOLIC PANEL: CPT

## 2024-08-06 PROCEDURE — 80048 BASIC METABOLIC PNL TOTAL CA: CPT

## 2024-08-06 PROCEDURE — 85027 COMPLETE CBC AUTOMATED: CPT

## 2024-08-06 PROCEDURE — 36415 COLL VENOUS BLD VENIPUNCTURE: CPT

## 2024-08-06 PROCEDURE — 0225U NFCT DS DNA&RNA 21 SARSCOV2: CPT

## 2024-08-06 PROCEDURE — 99285 EMERGENCY DEPT VISIT HI MDM: CPT

## 2024-08-06 PROCEDURE — 97161 PT EVAL LOW COMPLEX 20 MIN: CPT

## 2024-08-06 PROCEDURE — 72100 X-RAY EXAM L-S SPINE 2/3 VWS: CPT

## 2024-08-06 PROCEDURE — 93005 ELECTROCARDIOGRAM TRACING: CPT

## 2024-08-06 PROCEDURE — 93306 TTE W/DOPPLER COMPLETE: CPT

## 2024-08-06 PROCEDURE — 84100 ASSAY OF PHOSPHORUS: CPT

## 2024-08-06 PROCEDURE — 87340 HEPATITIS B SURFACE AG IA: CPT

## 2024-08-06 PROCEDURE — 83880 ASSAY OF NATRIURETIC PEPTIDE: CPT

## 2024-08-06 PROCEDURE — 99261: CPT

## 2024-08-21 RX ORDER — ATORVASTATIN CALCIUM 40 MG/1
1 TABLET, FILM COATED ORAL
Refills: 0 | DISCHARGE

## 2024-08-21 RX ORDER — LOSARTAN POTASSIUM 50 MG/1
1 TABLET, FILM COATED ORAL
Refills: 0 | DISCHARGE

## 2024-08-21 RX ORDER — ASPIRIN 500 MG
1 TABLET ORAL
Refills: 0 | DISCHARGE

## 2024-08-21 RX ORDER — FERROUS SULFATE 325(65) MG
325 TABLET ORAL
Refills: 0 | DISCHARGE

## 2024-08-21 RX ORDER — HYDRALAZINE HYDROCHLORIDE 100 MG/1
1 TABLET ORAL
Qty: 0 | Refills: 0 | DISCHARGE

## 2024-08-21 RX ORDER — MULTIVITAMIN/IRON/FOLIC ACID 18MG-0.4MG
1 TABLET ORAL
Qty: 0 | Refills: 0 | DISCHARGE

## 2024-08-21 RX ORDER — PANTOPRAZOLE SODIUM 20 MG/1
1 TABLET, DELAYED RELEASE ORAL
Refills: 0 | DISCHARGE

## 2024-08-21 RX ORDER — NIFEDIPINE 20 MG/1
1 CAPSULE, LIQUID FILLED ORAL
Qty: 0 | Refills: 0 | DISCHARGE

## 2024-08-21 RX ORDER — ASPIRIN 500 MG
1 TABLET ORAL
Qty: 0 | Refills: 0 | DISCHARGE

## 2024-08-21 RX ORDER — METOPROLOL TARTRATE 100 MG
1 TABLET ORAL
Refills: 0 | DISCHARGE

## 2024-08-21 RX ORDER — TICAGRELOR 90 MG/1
1 TABLET ORAL
Qty: 0 | Refills: 0 | DISCHARGE